# Patient Record
Sex: FEMALE | Race: WHITE | NOT HISPANIC OR LATINO | Employment: OTHER | URBAN - METROPOLITAN AREA
[De-identification: names, ages, dates, MRNs, and addresses within clinical notes are randomized per-mention and may not be internally consistent; named-entity substitution may affect disease eponyms.]

---

## 2018-08-02 ENCOUNTER — OFFICE VISIT (OUTPATIENT)
Dept: PODIATRY | Facility: CLINIC | Age: 83
End: 2018-08-02
Payer: MEDICARE

## 2018-08-02 VITALS
DIASTOLIC BLOOD PRESSURE: 59 MMHG | SYSTOLIC BLOOD PRESSURE: 114 MMHG | HEIGHT: 59 IN | BODY MASS INDEX: 33.34 KG/M2 | HEART RATE: 72 BPM | WEIGHT: 165.38 LBS

## 2018-08-02 DIAGNOSIS — M20.42 HAMMER TOES OF BOTH FEET: ICD-10-CM

## 2018-08-02 DIAGNOSIS — L84 PRE-ULCERATIVE CALLUSES: ICD-10-CM

## 2018-08-02 DIAGNOSIS — B35.1 DERMATOPHYTOSIS OF NAIL: ICD-10-CM

## 2018-08-02 DIAGNOSIS — E11.49 TYPE II DIABETES MELLITUS WITH NEUROLOGICAL MANIFESTATIONS: Primary | ICD-10-CM

## 2018-08-02 DIAGNOSIS — M20.41 HAMMER TOES OF BOTH FEET: ICD-10-CM

## 2018-08-02 DIAGNOSIS — R60.0 LOWER LEG EDEMA: ICD-10-CM

## 2018-08-02 PROCEDURE — 99204 OFFICE O/P NEW MOD 45 MIN: CPT | Mod: 25,S$PBB,, | Performed by: PODIATRIST

## 2018-08-02 PROCEDURE — 11721 DEBRIDE NAIL 6 OR MORE: CPT | Mod: Q9,S$PBB,, | Performed by: PODIATRIST

## 2018-08-02 PROCEDURE — 99203 OFFICE O/P NEW LOW 30 MIN: CPT | Mod: PBBFAC,PO,25 | Performed by: PODIATRIST

## 2018-08-02 PROCEDURE — 99999 PR PBB SHADOW E&M-NEW PATIENT-LVL III: CPT | Mod: PBBFAC,,, | Performed by: PODIATRIST

## 2018-08-02 PROCEDURE — 11721 DEBRIDE NAIL 6 OR MORE: CPT | Mod: Q9,PBBFAC,PO | Performed by: PODIATRIST

## 2018-08-02 RX ORDER — IBUPROFEN 200 MG
400 TABLET ORAL
COMMUNITY

## 2018-08-02 RX ORDER — TRIAMCINOLONE ACETONIDE 1 MG/G
CREAM TOPICAL
COMMUNITY
Start: 2018-04-16

## 2018-08-02 RX ORDER — GABAPENTIN 600 MG/1
600 TABLET ORAL
COMMUNITY
Start: 2018-03-28 | End: 2019-03-28

## 2018-08-02 RX ORDER — ZOLPIDEM TARTRATE 10 MG/1
10 TABLET ORAL
COMMUNITY
Start: 2018-07-02

## 2018-08-02 RX ORDER — LEVOTHYROXINE SODIUM 125 UG/1
125 TABLET ORAL
COMMUNITY
Start: 2018-01-30 | End: 2021-09-09 | Stop reason: DRUGHIGH

## 2018-08-02 RX ORDER — TRAVOPROST OPHTHALMIC SOLUTION 0.04 MG/ML
1 SOLUTION OPHTHALMIC
COMMUNITY
Start: 2015-11-16

## 2018-08-02 RX ORDER — NYSTATIN 100000 U/G
CREAM TOPICAL
COMMUNITY
Start: 2017-09-15 | End: 2018-09-15

## 2018-08-02 NOTE — PATIENT INSTRUCTIONS
[facility]    [address] [place] [address]  Phone: [phone]  Fax: [phone]

## 2018-08-02 NOTE — PROGRESS NOTES
Subjective:     Patient ID: Carie Rasheed is a 87 y.o. female.    Chief Complaint: toe problem (blood blister of the left great toe per Dr. Meier, patient is accompanied by her daughter )    Carie is a 87 y.o. female who presents to the clinic for evaluation and treatment of high risk feet. Carie has a past medical history of Allergic contact dermatitis, Hyperthyroidism, and Vertigo. The patient's chief complaint is toe blood blister. Patient is accompanied by her daughter today. Patient states she was sent by Dr. Meier to have her toe checked. Patient denies any injury to the toe and states the areas has healed. This patient has documented high risk feet requiring routine maintenance secondary to peripheral vascular disease.    PCP: Marcos Gonsalez MD    Date Last Seen by PCP: 6/23/18    Current shoe gear:  Affected Foot: Tennis shoes     Unaffected Foot: Tennis shoes    History of Trauma: negative  Sign of Infection: none    No results found for: HGBA1C          Patient Active Problem List   Diagnosis    Type II diabetes mellitus with neurological manifestations    Lower leg edema    Hypothyroidism       Medication List with Changes/Refills   Current Medications    GABAPENTIN (NEURONTIN) 600 MG TABLET    Take 600 mg by mouth.    IBUPROFEN (ADVIL,MOTRIN) 200 MG TABLET    Take 400 mg by mouth.    LEVOTHYROXINE (SYNTHROID) 125 MCG TABLET    Take 125 mcg by mouth.    NYSTATIN (MYCOSTATIN) CREAM    Apply topically.    TRAVOPROST (TRAVATAN Z) 0.004 % OPHTHALMIC SOLUTION    Apply 1 drop to eye.    TRIAMCINOLONE ACETONIDE 0.1% (KENALOG) 0.1 % CREAM    Apply to rash bid    ZOLPIDEM (AMBIEN) 10 MG TAB    Take 10 mg by mouth.       Review of patient's allergies indicates:   Allergen Reactions    Adhesive     Benzyl alcohol     Iron-b12-if-folic-mv-mins-dss     Mercaptobenzothiazole     Other omega-3s      [employer] [place]  Methyldibromo Gluatronitrile    Propylene glycol     Thimerosal        Past Surgical  "History:   Procedure Laterality Date    APPENDECTOMY      BLADDER SURGERY      CATARACT EXTRACTION      CHOLECYSTECTOMY      HYSTERECTOMY      INNER EAR SURGERY         History reviewed. No pertinent family history.    Social History     Socioeconomic History    Marital status:      Spouse name: Not on file    Number of children: Not on file    Years of education: Not on file    Highest education level: Not on file   Social Needs    Financial resource strain: Not on file    Food insecurity - worry: Not on file    Food insecurity - inability: Not on file    Transportation needs - medical: Not on file    Transportation needs - non-medical: Not on file   Occupational History    Not on file   Tobacco Use    Smoking status: Not on file   Substance and Sexual Activity    Alcohol use: Not on file    Drug use: Not on file    Sexual activity: Not on file   Other Topics Concern    Not on file   Social History Narrative    Not on file       Vitals:    08/02/18 1617 08/02/18 1631   BP: (!) 95/41 (!) 114/59   Pulse: 68 72   Weight: 75 kg (165 lb 5.5 oz)    Height: 4' 11" (1.499 m)    PainSc: 0-No pain        Review of Systems   Constitutional: Negative for chills and fever.   Respiratory: Negative for shortness of breath.    Cardiovascular: Positive for leg swelling. Negative for chest pain, palpitations, orthopnea and claudication.   Gastrointestinal: Negative for diarrhea, nausea and vomiting.   Musculoskeletal: Negative for joint pain.   Skin: Negative for rash.   Neurological: Positive for sensory change. Negative for dizziness, tingling, focal weakness and weakness.   Psychiatric/Behavioral: Negative.              Objective:      PHYSICAL EXAM: Apperance: Alert and orient in no distress,well developed, and with good attention to grooming and body habits  Patient presents ambulating in casual shoes.   LOWER EXTREMITY EXAM:  VASCULAR: Dorsalis pedis pulses 1/4 bilateral and Posterior Tibial pulses " 0/4 bilateral. Capillary fill time <4 seconds bilateral. Mild edema observed bilateral. Varicosities present bilateral. Skin temperature of the lower extremities is warm to cool, proximal to distal. Hair growth absent bilateral.  DERMATOLOGICAL: No skin rashes, subcutaneous nodules, lesions, or ulcers observed bilateral. Nails 2,3,4,5 bilateral elongated, thickened, and discolored with subungual debris. Bilateral hallux nails absent.  Webspaces 1,2,3,4 bilateral clean, dry and without evidence of break in skin integrity.   NEUROLOGICAL: Light touch, sharp-dull, proprioception all present and equal bilaterally.  Vibratory sensation absent at bilateral hallux. Protective sensation absent at 6/10 sites as tested with a [employer]-Laure 5.07 monofilament.   MUSCULOSKELETAL: Muscle strength is 5/5 for foot inverters, everters, plantarflexors, and dorsiflexors. Muscle tone is normal.           Assessment:       Encounter Diagnoses   Name Primary?    Type II diabetes mellitus with neurological manifestations Yes    Lower leg edema     Hammer toes of both feet     Pre-ulcerative calluses     Dermatophytosis of nail          Plan:   Type II diabetes mellitus with neurological manifestations  -     DIABETIC SHOES FOR HOME USE    Lower leg edema  -     DIABETIC SHOES FOR HOME USE    Hammer toes of both feet  -     DIABETIC SHOES FOR HOME USE    Pre-ulcerative calluses  -     DIABETIC SHOES FOR HOME USE    Dermatophytosis of nail      I counseled the patient on her conditions, regarding findings of my examination, my impressions, and usual treatment plan.   Greater than 50% of this visit spent on counseling and coordination of care.  Greater than 15 minutes of a 20 minute appointment spent on education about the diabetic foot, neuropathy, and prevention of limb loss.  Shoe inspection. Diabetic Foot Education. Patient reminded of the importance of good nutrition and blood sugar control to help prevent podiatric  complications of diabetes. Patient instructed on proper foot hygeine. We discussed wearing proper shoe gear, daily foot inspections, never walking without protective shoe gear, never putting sharp instruments to feet.    With patient's permission, nails 2-5 bilateral were debrided/trimmed in length and thickness aggressively to their soft tissue attachment mechanically and with electric , removing all offending nail and debris. Patient relates relief following the procedure.  Prescription written for Diabetic shoes and inserts.   Patient counseled on ways to improve swelling in lower extremities including decreasing/eliminating salt intake, elevating lower extremities, compression stocking, or oral medications. Patient states she understands.   Patient  will continue to monitor the areas daily, inspect feet, wear protective shoe gear when ambulatory, moisturizer to maintain skin integrity. Patient reminded of the importance of good nutrition and blood sugar control to help prevent podiatric complications of diabetes.  Patient to return 6 months or sooner if needed.                  Tessie Coates DPM  [facility]

## 2018-08-12 PROBLEM — E03.9 HYPOTHYROIDISM: Status: ACTIVE | Noted: 2018-08-12

## 2018-08-12 PROBLEM — E11.49 TYPE II DIABETES MELLITUS WITH NEUROLOGICAL MANIFESTATIONS: Status: ACTIVE | Noted: 2018-08-12

## 2018-08-12 PROBLEM — R60.0 LOWER LEG EDEMA: Status: ACTIVE | Noted: 2018-08-12

## 2018-08-27 ENCOUNTER — TELEPHONE (OUTPATIENT)
Dept: PODIATRY | Facility: CLINIC | Age: 83
End: 2018-08-27

## 2018-08-27 NOTE — TELEPHONE ENCOUNTER
----- Message from Danika Stoll sent at 8/27/2018 10:30 AM CDT -----  Contact: pt   States she's calling rg the place being referred to [employer] for her diabetic shoes and wants to know if she can go somewhere else and if not then she would need the chart notes for pt and can be reached at [phone] until noon [email or web address]

## 2018-08-27 NOTE — TELEPHONE ENCOUNTER
Patient was given a return call back at the number provided but there was no answer or an option to leave a message.

## 2018-12-05 ENCOUNTER — OFFICE VISIT (OUTPATIENT)
Dept: PODIATRY | Facility: CLINIC | Age: 83
End: 2018-12-05
Payer: MEDICARE

## 2018-12-05 VITALS
BODY MASS INDEX: 33.4 KG/M2 | HEART RATE: 70 BPM | HEIGHT: 59 IN | SYSTOLIC BLOOD PRESSURE: 116 MMHG | DIASTOLIC BLOOD PRESSURE: 59 MMHG

## 2018-12-05 DIAGNOSIS — B35.1 DERMATOPHYTOSIS OF NAIL: ICD-10-CM

## 2018-12-05 DIAGNOSIS — E11.49 TYPE II DIABETES MELLITUS WITH NEUROLOGICAL MANIFESTATIONS: Primary | ICD-10-CM

## 2018-12-05 PROCEDURE — 11721 DEBRIDE NAIL 6 OR MORE: CPT | Mod: Q9,PBBFAC,PO | Performed by: PODIATRIST

## 2018-12-05 PROCEDURE — 99499 UNLISTED E&M SERVICE: CPT | Mod: S$PBB,,, | Performed by: PODIATRIST

## 2018-12-05 PROCEDURE — 99213 OFFICE O/P EST LOW 20 MIN: CPT | Mod: PBBFAC,PO,25 | Performed by: PODIATRIST

## 2018-12-05 PROCEDURE — 11721 DEBRIDE NAIL 6 OR MORE: CPT | Mod: Q9,S$PBB,, | Performed by: PODIATRIST

## 2018-12-05 PROCEDURE — 99999 PR PBB SHADOW E&M-EST. PATIENT-LVL III: CPT | Mod: PBBFAC,,, | Performed by: PODIATRIST

## 2018-12-05 RX ORDER — TORSEMIDE 10 MG/1
10 TABLET ORAL DAILY PRN
COMMUNITY
Start: 2018-12-03 | End: 2019-12-03

## 2018-12-05 NOTE — PROGRESS NOTES
Subjective:     Patient ID: Carie Rasheed is a 87 y.o. female.    Chief Complaint: Routine Foot Care (PCP: Marcos Gonsalez 9/24/2018, diabetic nail care/feet check, patient is accompanied by her daughter )    Carie is a 87 y.o. female who presents to the clinic for evaluation and treatment of high risk feet. Carie has a past medical history of Acute allergic conjunctivitis, Allergic contact dermatitis, Hyperthyroidism, and Vertigo. The patient's chief complaint is long, thick toenails. This patient has documented high risk feet requiring routine maintenance secondary to peripheral neuropathy.    PCP: Marcos Gonsalez MD    Date Last Seen by PCP: 9/24/18    Current shoe gear:  Affected Foot: Casual shoes     Unaffected Foot: Casual shoes      Patient Active Problem List   Diagnosis    Type II diabetes mellitus with neurological manifestations    Lower leg edema    Hypothyroidism          Medication List           Accurate as of 12/5/18  1:21 PM. If you have any questions, ask your nurse or doctor.               CONTINUE taking these medications    gabapentin 600 MG tablet  Commonly known as:  NEURONTIN     ibuprofen 200 MG tablet  Commonly known as:  ADVIL,MOTRIN     levothyroxine 125 MCG tablet  Commonly known as:  SYNTHROID     nystatin cream  Commonly known as:  MYCOSTATIN     torsemide 10 MG Tab  Commonly known as:  DEMADEX     TRAVATAN Z 0.004 % ophthalmic solution  Generic drug:  travoprost     triamcinolone acetonide 0.1% 0.1 % cream  Commonly known as:  KENALOG     zolpidem 10 mg Tab  Commonly known as:  AMBIEN            Review of patient's allergies indicates:   Allergen Reactions    Adhesive     Benzyl alcohol     Iron-b12-if-folic-mv-mins-dss     Mercaptobenzothiazole     Other omega-3s      [employer] [place]  Methyldibromo Gluatronitrile    Propylene glycol     Thimerosal        Past Surgical History:   Procedure Laterality Date    APPENDECTOMY      BLADDER SURGERY      CATARACT  "EXTRACTION      CHOLECYSTECTOMY      HYSTERECTOMY      INNER EAR SURGERY         No family history on file.    Social History     Socioeconomic History    Marital status:      Spouse name: Not on file    Number of children: Not on file    Years of education: Not on file    Highest education level: Not on file   Social Needs    Financial resource strain: Not on file    Food insecurity - worry: Not on file    Food insecurity - inability: Not on file    Transportation needs - medical: Not on file    Transportation needs - non-medical: Not on file   Occupational History    Not on file   Tobacco Use    Smoking status: Never Smoker   Substance and Sexual Activity    Alcohol use: Not on file    Drug use: Not on file    Sexual activity: Not on file   Other Topics Concern    Not on file   Social History Narrative    Not on file       Vitals:    12/05/18 1304   BP: (!) 103/48   Pulse: 72   Height: 4' 11" (1.499 m)   PainSc: 0-No pain       Review of Systems   Constitutional: Negative for chills and fever.   Respiratory: Negative for shortness of breath.    Cardiovascular: Positive for leg swelling. Negative for chest pain, palpitations, orthopnea and claudication.   Gastrointestinal: Negative for diarrhea, nausea and vomiting.   Musculoskeletal: Negative for joint pain.   Skin: Negative for rash.   Neurological: Positive for sensory change. Negative for dizziness, tingling, focal weakness and weakness.   Psychiatric/Behavioral: Negative.              Objective:   PHYSICAL EXAM: Apperance: Alert and orient in no distress,well developed, and with good attention to grooming and body habits  Patient presents ambulating in casual shoes.   LOWER EXTREMITY EXAM:  VASCULAR: Dorsalis pedis pulses 1/4 bilateral and Posterior Tibial pulses 0/4 bilateral. Capillary fill time <4 seconds bilateral. Mild edema observed bilateral. Varicosities present bilateral. Skin temperature of the lower extremities is warm to " cool, proximal to distal. Hair growth absent bilateral.  DERMATOLOGICAL: No skin rashes, subcutaneous nodules, lesions, or ulcers observed bilateral. Nails 2,3,4,5 bilateral elongated, thickened, and discolored with subungual debris. Bilateral hallux nails absent.  Webspaces 1,2,3,4 bilateral clean, dry and without evidence of break in skin integrity.   NEUROLOGICAL: Light touch, sharp-dull, proprioception all present and equal bilaterally.  Vibratory sensation absent at bilateral hallux. Protective sensation absent at 6/10 sites as tested with a [employer]-Laure 5.07 monofilament.   MUSCULOSKELETAL: Muscle strength is 5/5 for foot inverters, everters, plantarflexors, and dorsiflexors. Muscle tone is normal.       Assessment:   Type II diabetes mellitus with neurological manifestations    Dermatophytosis of nail          Plan:   Type II diabetes mellitus with neurological manifestations    Dermatophytosis of nail      I counseled the patient on her conditions, regarding findings of my examination, my impressions, and usual treatment plan.   Greater than 50% of this visit spent on counseling and coordination of care.  Greater than 15 minutes of a 20 minute appointment spent on education about the diabetic foot, neuropathy, and prevention of limb loss.  Shoe inspection. Diabetic Foot Education. Patient reminded of the importance of good nutrition and blood sugar control to help prevent podiatric complications of diabetes. Patient instructed on proper foot hygeine. We discussed wearing proper shoe gear, daily foot inspections, never walking without protective shoe gear, never putting sharp instruments to feet.    With patient's permission, nails 2-5 bilateral were debrided/trimmed in length and thickness aggressively to their soft tissue attachment mechanically and with electric , removing all offending nail and debris. Patient relates relief following the procedure.  Patient  will continue to monitor the areas  daily, inspect feet, wear protective shoe gear when ambulatory, moisturizer to maintain skin integrity. Patient reminded of the importance of good nutrition and blood sugar control to help prevent podiatric complications of diabetes.  Patient to return 4 months or sooner if needed.                 Tessie Coates DPM  [facility]

## 2019-04-11 ENCOUNTER — OFFICE VISIT (OUTPATIENT)
Dept: PODIATRY | Facility: CLINIC | Age: 84
End: 2019-04-11
Payer: MEDICARE

## 2019-04-11 VITALS
SYSTOLIC BLOOD PRESSURE: 102 MMHG | HEART RATE: 52 BPM | HEIGHT: 59 IN | WEIGHT: 165 LBS | DIASTOLIC BLOOD PRESSURE: 58 MMHG | BODY MASS INDEX: 33.26 KG/M2

## 2019-04-11 DIAGNOSIS — R60.0 LOWER LEG EDEMA: ICD-10-CM

## 2019-04-11 DIAGNOSIS — B35.1 DERMATOPHYTOSIS OF NAIL: ICD-10-CM

## 2019-04-11 DIAGNOSIS — E11.49 TYPE II DIABETES MELLITUS WITH NEUROLOGICAL MANIFESTATIONS: Primary | ICD-10-CM

## 2019-04-11 PROCEDURE — 11721 PR DEBRIDEMENT OF NAILS, 6 OR MORE: ICD-10-PCS | Mod: Q9,S$PBB,, | Performed by: PODIATRIST

## 2019-04-11 PROCEDURE — 99999 PR PBB SHADOW E&M-EST. PATIENT-LVL III: ICD-10-PCS | Mod: PBBFAC,,, | Performed by: PODIATRIST

## 2019-04-11 PROCEDURE — 11721 DEBRIDE NAIL 6 OR MORE: CPT | Mod: Q9,S$PBB,, | Performed by: PODIATRIST

## 2019-04-11 PROCEDURE — 99499 NO LOS: ICD-10-PCS | Mod: S$PBB,,, | Performed by: PODIATRIST

## 2019-04-11 PROCEDURE — 99213 OFFICE O/P EST LOW 20 MIN: CPT | Mod: PBBFAC,PN,25 | Performed by: PODIATRIST

## 2019-04-11 PROCEDURE — 99499 UNLISTED E&M SERVICE: CPT | Mod: S$PBB,,, | Performed by: PODIATRIST

## 2019-04-11 PROCEDURE — 11721 DEBRIDE NAIL 6 OR MORE: CPT | Mod: Q9,PBBFAC,PN | Performed by: PODIATRIST

## 2019-04-11 PROCEDURE — 99999 PR PBB SHADOW E&M-EST. PATIENT-LVL III: CPT | Mod: PBBFAC,,, | Performed by: PODIATRIST

## 2019-04-11 RX ORDER — BUMETANIDE 1 MG/1
1 TABLET ORAL DAILY PRN
COMMUNITY
Start: 2019-02-25

## 2019-04-11 NOTE — PROGRESS NOTES
Subjective:     Patient ID: Carie Rasheed is a 87 y.o. female.    Chief Complaint: Nail Care (no c/o pain. c/o brusing of right 3rd and 4th toe. wears casual shoes with out socks. diabetic Pt. bilateral last seen on 02/21/19 with PCP Dr. Gonsalez)    Carie is a 87 y.o. female who presents to the clinic for evaluation and treatment of high risk feet. Carie has a past medical history of Acute allergic conjunctivitis, Allergic contact dermatitis, Hyperthyroidism, and Vertigo. The patient's chief complaint is long, thick toenails. This patient has documented high risk feet requiring routine maintenance secondary to peripheral neuropathy.    PCP: Marcos Gonsalez MD    Date Last Seen by PCP: 2/21/19    Current shoe gear:  Affected Foot: Casual shoes     Unaffected Foot: Casual shoes      Patient Active Problem List   Diagnosis    Type II diabetes mellitus with neurological manifestations    Lower leg edema    Hypothyroidism       Medication List with Changes/Refills   Current Medications    BUMETANIDE (BUMEX) 1 MG TABLET    Take 1 mg by mouth daily as needed.    GABAPENTIN (NEURONTIN) 600 MG TABLET    Take 600 mg by mouth.    IBUPROFEN (ADVIL,MOTRIN) 200 MG TABLET    Take 400 mg by mouth.    LEVOTHYROXINE (SYNTHROID) 125 MCG TABLET    Take 125 mcg by mouth.    MAGNESIUM OXIDE-MG AA CHELATE 300 MG CAP    Take by mouth.    NYSTATIN (MYCOSTATIN) CREAM    Apply topically.    TORSEMIDE (DEMADEX) 10 MG TAB    Take 10 mg by mouth daily as needed.    TRAVOPROST (TRAVATAN Z) 0.004 % OPHTHALMIC SOLUTION    Apply 1 drop to eye.    TRIAMCINOLONE ACETONIDE 0.1% (KENALOG) 0.1 % CREAM    Apply to rash bid    ZOLPIDEM (AMBIEN) 10 MG TAB    Take 10 mg by mouth.       Review of patient's allergies indicates:   Allergen Reactions    Adhesive     Benzyl alcohol     Iron-b12-if-folic-mv-mins-dss     Mercaptobenzothiazole     Other omega-3s      [employer] [place]  Methyldibromo Gluatronitrile    Propylene glycol     Thimerosal   "      Past Surgical History:   Procedure Laterality Date    APPENDECTOMY      BLADDER SURGERY      CATARACT EXTRACTION      CHOLECYSTECTOMY      HYSTERECTOMY      INNER EAR SURGERY         History reviewed. No pertinent family history.    Social History     Socioeconomic History    Marital status:      Spouse name: Not on file    Number of children: Not on file    Years of education: Not on file    Highest education level: Not on file   Occupational History    Not on file   Social Needs    Financial resource strain: Not on file    Food insecurity:     Worry: Not on file     Inability: Not on file    Transportation needs:     Medical: Not on file     Non-medical: Not on file   Tobacco Use    Smoking status: Never Smoker   Substance and Sexual Activity    Alcohol use: Not on file    Drug use: Not on file    Sexual activity: Not on file   Lifestyle    Physical activity:     Days per week: Not on file     Minutes per session: Not on file    Stress: Not on file   Relationships    Social connections:     Talks on phone: Not on file     Gets together: Not on file     Attends [employer] witness service: Not on file     Active member of club or organization: Not on file     Attends meetings of clubs or organizations: Not on file     Relationship status: Not on file   Other Topics Concern    Not on file   Social History Narrative    Not on file       Vitals:    04/11/19 1325   BP: (!) 102/58   Pulse: (!) 52   Weight: 74.8 kg (165 lb)   Height: 4' 11" (1.499 m)   PainSc: 0-No pain   PainLoc: Foot       Review of Systems   Constitutional: Negative for chills and fever.   Respiratory: Negative for shortness of breath.    Cardiovascular: Positive for leg swelling. Negative for chest pain, palpitations, orthopnea and claudication.   Gastrointestinal: Negative for diarrhea, nausea and vomiting.   Musculoskeletal: Negative for joint pain.   Skin: Negative for rash.   Neurological: Positive for sensory change. " Negative for dizziness, tingling, focal weakness and weakness.   Psychiatric/Behavioral: Negative.              Objective:   PHYSICAL EXAM: Apperance: Alert and orient in no distress,well developed, and with good attention to grooming and body habits  Patient presents ambulating in casual shoes.   LOWER EXTREMITY EXAM:  VASCULAR: Dorsalis pedis pulses 1/4 bilateral and Posterior Tibial pulses 0/4 bilateral. Capillary fill time <4 seconds bilateral. Mild edema observed bilateral, right>left. Varicosities present bilateral. Skin temperature of the lower extremities is warm to cool, proximal to distal. Hair growth absent bilateral.  DERMATOLOGICAL: No skin rashes, subcutaneous nodules, lesions, or ulcers observed bilateral. Nails 2,3,4,5 bilateral elongated, thickened, and discolored with subungual debris. Bilateral hallux nails absent.  Webspaces 1,2,3,4 bilateral clean, dry and without evidence of break in skin integrity.   NEUROLOGICAL: Light touch, sharp-dull, proprioception all present and equal bilaterally.  Vibratory sensation absent at bilateral hallux. Protective sensation absent at 6/10 sites as tested with a [place]-Laure 5.07 monofilament.   MUSCULOSKELETAL: Muscle strength is 5/5 for foot inverters, everters, plantarflexors, and dorsiflexors. Muscle tone is normal.       Assessment:   Type II diabetes mellitus with neurological manifestations    Dermatophytosis of nail    Lower leg edema          Plan:   Type II diabetes mellitus with neurological manifestations    Dermatophytosis of nail    Lower leg edema      I counseled the patient on her conditions, regarding findings of my examination, my impressions, and usual treatment plan.   Greater than 15 minutes of a 20 minute appointment spent on education about the diabetic foot, neuropathy, and prevention of limb loss.  Shoe inspection. Diabetic Foot Education. Patient reminded of the importance of good nutrition and blood sugar control to help prevent  podiatric complications of diabetes. Patient instructed on proper foot hygeine. We discussed wearing proper shoe gear, daily foot inspections, never walking without protective shoe gear, never putting sharp instruments to feet.    With patient's permission, nails 2-5 bilateral were debrided/trimmed in length and thickness aggressively to their soft tissue attachment mechanically and with electric , removing all offending nail and debris. Patient relates relief following the procedure.  Patient counseled on ways to improve swelling in lower extremities including decreasing/eliminating salt intake, elevating lower extremities, compression stocking, or oral medications. Patient states she understands.   Patient  will continue to monitor the areas daily, inspect feet, wear protective shoe gear when ambulatory, moisturizer to maintain skin integrity. Patient reminded of the importance of good nutrition and blood sugar control to help prevent podiatric complications of diabetes.  Patient to return 4 months or sooner if needed.                 Tessie Coates DPM  [facility]

## 2019-11-04 ENCOUNTER — OFFICE VISIT (OUTPATIENT)
Dept: PODIATRY | Facility: CLINIC | Age: 84
End: 2019-11-04
Payer: MEDICARE

## 2019-11-04 VITALS
WEIGHT: 165 LBS | SYSTOLIC BLOOD PRESSURE: 125 MMHG | DIASTOLIC BLOOD PRESSURE: 67 MMHG | BODY MASS INDEX: 33.26 KG/M2 | HEIGHT: 59 IN

## 2019-11-04 DIAGNOSIS — E11.49 TYPE II DIABETES MELLITUS WITH NEUROLOGICAL MANIFESTATIONS: Primary | ICD-10-CM

## 2019-11-04 DIAGNOSIS — R60.0 LOWER LEG EDEMA: ICD-10-CM

## 2019-11-04 DIAGNOSIS — B35.1 DERMATOPHYTOSIS OF NAIL: ICD-10-CM

## 2019-11-04 PROCEDURE — 11721 DEBRIDE NAIL 6 OR MORE: CPT | Mod: Q9,PBBFAC | Performed by: PODIATRIST

## 2019-11-04 PROCEDURE — 99999 PR PBB SHADOW E&M-EST. PATIENT-LVL III: ICD-10-PCS | Mod: PBBFAC,,, | Performed by: PODIATRIST

## 2019-11-04 PROCEDURE — 11721 DEBRIDE NAIL 6 OR MORE: CPT | Mod: Q9,S$PBB,, | Performed by: PODIATRIST

## 2019-11-04 PROCEDURE — 99499 UNLISTED E&M SERVICE: CPT | Mod: S$PBB,,, | Performed by: PODIATRIST

## 2019-11-04 PROCEDURE — 11721 PR DEBRIDEMENT OF NAILS, 6 OR MORE: ICD-10-PCS | Mod: Q9,S$PBB,, | Performed by: PODIATRIST

## 2019-11-04 PROCEDURE — 99999 PR PBB SHADOW E&M-EST. PATIENT-LVL III: CPT | Mod: PBBFAC,,, | Performed by: PODIATRIST

## 2019-11-04 PROCEDURE — 99499 NO LOS: ICD-10-PCS | Mod: S$PBB,,, | Performed by: PODIATRIST

## 2019-11-04 PROCEDURE — 99213 OFFICE O/P EST LOW 20 MIN: CPT | Mod: PBBFAC,25 | Performed by: PODIATRIST

## 2019-11-05 NOTE — PROGRESS NOTES
Subjective:     Patient ID: Carie Rasheed is a 88 y.o. female.    Chief Complaint: Nail Problem (bilateral swelling. wears slippers. diabetic Pt. last seen on 10/17/19 with PCP Dr. Gonsalez)    Carie is a 88 y.o. female who presents to the clinic for evaluation and treatment of high risk feet. Carie has a past medical history of Acute allergic conjunctivitis, Allergic contact dermatitis, Hyperthyroidism, and Vertigo. The patient's chief complaint is long, thick toenails. This patient has documented high risk feet requiring routine maintenance secondary to peripheral neuropathy. Patients daughter states patient had a partial blood clot and was on Eliquis for some months but is now on baby aspirin.     PCP: Marcos Gonsalez MD    Date Last Seen by PCP: 10/17/19    Current shoe gear:  Affected Foot: Casual shoes     Unaffected Foot: Casual shoes      Patient Active Problem List   Diagnosis    Type II diabetes mellitus with neurological manifestations    Lower leg edema    Hypothyroidism       Medication List with Changes/Refills   Current Medications    BUMETANIDE (BUMEX) 1 MG TABLET    Take 1 mg by mouth daily as needed.    GABAPENTIN (NEURONTIN) 600 MG TABLET    Take 600 mg by mouth.    IBUPROFEN (ADVIL,MOTRIN) 200 MG TABLET    Take 400 mg by mouth.    LEVOTHYROXINE (SYNTHROID) 125 MCG TABLET    Take 125 mcg by mouth.    MAGNESIUM OXIDE-MG AA CHELATE 300 MG CAP    Take by mouth.    NYSTATIN (MYCOSTATIN) CREAM    Apply topically.    TORSEMIDE (DEMADEX) 10 MG TAB    Take 10 mg by mouth daily as needed.    TRAVOPROST (TRAVATAN Z) 0.004 % OPHTHALMIC SOLUTION    Apply 1 drop to eye.    TRIAMCINOLONE ACETONIDE 0.1% (KENALOG) 0.1 % CREAM    Apply to rash bid    ZOLPIDEM (AMBIEN) 10 MG TAB    Take 10 mg by mouth.       Review of patient's allergies indicates:   Allergen Reactions    Adhesive     Benzyl alcohol     Iron-b12-if-folic-mv-mins-dss     Mercaptobenzothiazole     Other omega-3s      Balsam of  "[place]  Methyldibromo Gluatronitrile    Propylene glycol     Thimerosal        Past Surgical History:   Procedure Laterality Date    APPENDECTOMY      BLADDER SURGERY      CATARACT EXTRACTION      CHOLECYSTECTOMY      HYSTERECTOMY      INNER EAR SURGERY         History reviewed. No pertinent family history.    Social History     Socioeconomic History    Marital status:      Spouse name: Not on file    Number of children: Not on file    Years of education: Not on file    Highest education level: Not on file   Occupational History    Not on file   Social Needs    Financial resource strain: Not on file    Food insecurity:     Worry: Not on file     Inability: Not on file    Transportation needs:     Medical: Not on file     Non-medical: Not on file   Tobacco Use    Smoking status: Never Smoker   Substance and Sexual Activity    Alcohol use: Not on file    Drug use: Not on file    Sexual activity: Not on file   Lifestyle    Physical activity:     Days per week: Not on file     Minutes per session: Not on file    Stress: Not on file   Relationships    Social connections:     Talks on phone: Not on file     Gets together: Not on file     Attends Anabaptist service: Not on file     Active member of club or organization: Not on file     Attends meetings of clubs or organizations: Not on file     Relationship status: Not on file   Other Topics Concern    Not on file   Social History Narrative    Not on file       Vitals:    11/04/19 1520   BP: 125/67   Weight: 74.8 kg (165 lb)   Height: 4' 11" (1.499 m)   PainSc: 0-No pain   PainLoc: Foot       Review of Systems   Constitutional: Negative for chills and fever.   Respiratory: Negative for shortness of breath.    Cardiovascular: Positive for leg swelling. Negative for chest pain, palpitations, orthopnea and claudication.   Gastrointestinal: Negative for diarrhea, nausea and vomiting.   Musculoskeletal: Negative for joint pain.   Skin: Negative for " rash.   Neurological: Positive for sensory change. Negative for dizziness, tingling, focal weakness and weakness.   Psychiatric/Behavioral: Negative.              Objective:   PHYSICAL EXAM: Apperance: Alert and orient in no distress,well developed, and with good attention to grooming and body habits  Patient presents ambulating in casual shoes.   LOWER EXTREMITY EXAM:  VASCULAR: Dorsalis pedis pulses 1/4 bilateral and Posterior Tibial pulses 0/4 bilateral. Capillary fill time <4 seconds bilateral. Decreased edema observed bilateral, right>left. Varicosities present bilateral. Skin temperature of the lower extremities is warm to cool, proximal to distal. Hair growth absent bilateral.  DERMATOLOGICAL: No skin rashes, subcutaneous nodules, lesions, or ulcers observed bilateral. Nails 2,3,4,5 bilateral elongated, thickened, and discolored with subungual debris. Bilateral hallux nails absent.  Webspaces 1,2,3,4 bilateral clean, dry and without evidence of break in skin integrity.   NEUROLOGICAL: Light touch, sharp-dull, proprioception all present and equal bilaterally.  Vibratory sensation absent at bilateral hallux. Protective sensation absent at 6/10 sites as tested with a [place]ure 5.07 monofilament.   MUSCULOSKELETAL: Muscle strength is 5/5 for foot inverters, everters, plantarflexors, and dorsiflexors. Muscle tone is normal.       Assessment:   Type II diabetes mellitus with neurological manifestations    Dermatophytosis of nail    Lower leg edema          Plan:   Type II diabetes mellitus with neurological manifestations    Dermatophytosis of nail    Lower leg edema      I counseled the patient on her conditions, regarding findings of my examination, my impressions, and usual treatment plan.   Greater than 15 minutes of a 20 minute appointment spent on education about the diabetic foot, neuropathy, and prevention of limb loss.  Shoe inspection. Diabetic Foot Education. Patient reminded of the importance of  good nutrition and blood sugar control to help prevent podiatric complications of diabetes. Patient instructed on proper foot hygeine. We discussed wearing proper shoe gear, daily foot inspections, never walking without protective shoe gear, never putting sharp instruments to feet.    With patient's permission, nails 2-5 bilateral were debrided/trimmed in length and thickness aggressively to their soft tissue attachment mechanically and with electric , removing all offending nail and debris. Patient relates relief following the procedure.  Patient counseled on ways to improve swelling in lower extremities including decreasing/eliminating salt intake, elevating lower extremities, compression stocking, or oral medications. Patient states she understands.   Patient  will continue to monitor the areas daily, inspect feet, wear protective shoe gear when ambulatory, moisturizer to maintain skin integrity. Patient reminded of the importance of good nutrition and blood sugar control to help prevent podiatric complications of diabetes.  Patient to return 4 months or sooner if needed.                 Tessie Coates DPM  [facility]

## 2020-03-02 ENCOUNTER — TELEPHONE (OUTPATIENT)
Dept: PODIATRY | Facility: CLINIC | Age: 85
End: 2020-03-02

## 2020-03-02 NOTE — TELEPHONE ENCOUNTER
Attempted to contact pt in regards to 3/9/2020 appt. Pt unable to contact, will try again at a later time.

## 2020-03-04 ENCOUNTER — TELEPHONE (OUTPATIENT)
Dept: PODIATRY | Facility: CLINIC | Age: 85
End: 2020-03-04

## 2020-03-06 ENCOUNTER — TELEPHONE (OUTPATIENT)
Dept: PODIATRY | Facility: CLINIC | Age: 85
End: 2020-03-06

## 2020-03-06 NOTE — TELEPHONE ENCOUNTER
Called patient to reschedule podiatry appointment on Monday, March 9, 2020. There was no answer. I left a voice message requesting a call back to get rescheduled.

## 2020-03-18 ENCOUNTER — TELEPHONE (OUTPATIENT)
Dept: PODIATRY | Facility: CLINIC | Age: 85
End: 2020-03-18

## 2020-03-18 NOTE — TELEPHONE ENCOUNTER
Left detailed message for  Pt in regards to appt on March 24,2020. Explained to pt in regards to the quality and safety of Pt's during the coronavirus that we are trying to decrease the spread of infection and would be rescheduling appt. Will try again at a later date.

## 2020-06-01 ENCOUNTER — TELEPHONE (OUTPATIENT)
Dept: PODIATRY | Facility: CLINIC | Age: 85
End: 2020-06-01

## 2020-06-01 NOTE — TELEPHONE ENCOUNTER
Returned patient phone call. No answer. I left a voice message requesting a all back.    ----- Message from Reshma Dickson sent at 6/1/2020 10:51 AM CDT -----  Type:  Sooner Apoointment Request    Caller is requesting a sooner appointment.  Caller declined first available appointment listed below.  Caller will not accept being placed on the waitlist and is requesting a message be sent to doctor.  Name of Caller: Pt daughter  (Nicole)  When is the first available appointment?  Several days  Symptoms:  White spot on toe  Would the patient rather a call back or a response via [facility]?   Call back  Best Call Back Number:  [phone]  Additional Information:  Pt would like to know if possible to be seen tomorrow//please call//darnell/timothy

## 2020-06-02 ENCOUNTER — OFFICE VISIT (OUTPATIENT)
Dept: PODIATRY | Facility: CLINIC | Age: 85
End: 2020-06-02
Payer: MEDICARE

## 2020-06-02 VITALS
BODY MASS INDEX: 33.24 KG/M2 | WEIGHT: 164.88 LBS | DIASTOLIC BLOOD PRESSURE: 48 MMHG | HEART RATE: 87 BPM | SYSTOLIC BLOOD PRESSURE: 112 MMHG | HEIGHT: 59 IN

## 2020-06-02 DIAGNOSIS — R60.0 BILATERAL LOWER EXTREMITY EDEMA: ICD-10-CM

## 2020-06-02 DIAGNOSIS — B35.1 DERMATOPHYTOSIS OF NAIL: ICD-10-CM

## 2020-06-02 DIAGNOSIS — E11.42 DM TYPE 2 WITH DIABETIC PERIPHERAL NEUROPATHY: Primary | ICD-10-CM

## 2020-06-02 DIAGNOSIS — M20.41 HAMMERTOE OF SECOND TOE OF RIGHT FOOT: ICD-10-CM

## 2020-06-02 PROCEDURE — 99999 PR PBB SHADOW E&M-EST. PATIENT-LVL III: ICD-10-PCS | Mod: PBBFAC,,, | Performed by: PODIATRIST

## 2020-06-02 PROCEDURE — 11721 DEBRIDE NAIL 6 OR MORE: CPT | Mod: Q9,S$PBB,, | Performed by: PODIATRIST

## 2020-06-02 PROCEDURE — 11721 PR DEBRIDEMENT OF NAILS, 6 OR MORE: ICD-10-PCS | Mod: Q9,S$PBB,, | Performed by: PODIATRIST

## 2020-06-02 PROCEDURE — 99999 PR PBB SHADOW E&M-EST. PATIENT-LVL III: CPT | Mod: PBBFAC,,, | Performed by: PODIATRIST

## 2020-06-02 PROCEDURE — 99214 OFFICE O/P EST MOD 30 MIN: CPT | Mod: 25,S$PBB,, | Performed by: PODIATRIST

## 2020-06-02 PROCEDURE — 99213 OFFICE O/P EST LOW 20 MIN: CPT | Mod: PBBFAC | Performed by: PODIATRIST

## 2020-06-02 PROCEDURE — 11721 DEBRIDE NAIL 6 OR MORE: CPT | Mod: Q9,PBBFAC | Performed by: PODIATRIST

## 2020-06-02 PROCEDURE — 99214 PR OFFICE/OUTPT VISIT, EST, LEVL IV, 30-39 MIN: ICD-10-PCS | Mod: 25,S$PBB,, | Performed by: PODIATRIST

## 2020-06-02 RX ORDER — POTASSIUM CHLORIDE 750 MG/1
10 TABLET, EXTENDED RELEASE ORAL
COMMUNITY
Start: 2019-02-25

## 2020-06-02 RX ORDER — DICLOFENAC SODIUM 10 MG/G
2 GEL TOPICAL 2 TIMES DAILY
Qty: 60 G | Refills: 0 | Status: SHIPPED | OUTPATIENT
Start: 2020-06-02 | End: 2020-06-02

## 2020-06-02 RX ORDER — ASPIRIN 81 MG/1
81 TABLET ORAL
COMMUNITY

## 2020-06-02 NOTE — PATIENT INSTRUCTIONS
Treating Mallet, Hammer, and Claw Toes  Definitions  A hammer toe has an abnormal bend in the middle joint of your toe (toe is bent upward at joint).  Mallet toe affects the joint nearest your toenail (toe is bent downward at joint).  Claw toe affects the joint at the ball of your foot (toe is bent upward at joint), as well as both toe joints (toe is bent downward at both joints).  Hammer toe and mallet toe are most likely to occur in the toe next to your big toe.  Causes  The most common cause for all 3 deformities is poorly fitting shoes and tight shoes, especially high heels for women. Trauma and nerve damage from various diseases like diabetes may also cause these deformities.  Treatment  Buying shoes with more room in the toes, filing down corns and calluses, and padding, taping, or strapping the toe most often relieve the pain. Toe stretching and exercises may also be helpful. If these steps dont work, you may need surgery to straighten your toes.  Shoes  Buy low-heeled shoes with plenty of room in the front. This keeps your toes from being jammed against the end of your shoe. It also keeps your shoe from rubbing the tops of your toes.  Corns and calluses    To file down a corn or callus, soak your foot in warm water. This softens the hard skin. Dry your foot. Then gently rub the corn or callus with a pumice stone or nail file.  Pads and splints  If you still have pain, you may need to put a pad or splint on your toe. This helps take pressure off the painful corn or callus.  · For a mallet toe, you can put a gel pad on the toe. This keeps the tip of the toe from rubbing against the bottom of the shoe.  · For a hammer or claw toe, you can put a felt or foam pad over the bent joint. This keeps the toe from rubbing on the top of the shoe.  · For a hammer or claw toe that is still flexible, you can put a splint on the toe. This keeps it straight so it doesn't rub on the top of the shoe.    Date Last Reviewed:  9/29/2015  © 6052-5100 [employer]. [address] [place] [address], [place] [address]. All rights reserved. This information is not intended as a substitute for professional medical care. Always follow your healthcare professional's instructions.

## 2020-06-02 NOTE — PROGRESS NOTES
PODIATRIC MEDICINE AND SURGERY     CHIEF COMPLAINT  Chief Complaint   Patient presents with    Hammer Toe     Pt complains of hammertoes b/l on the 2nd toes. Pt states no current pain    PCP: Dr. Gonsalez, last visit: 4/2020    HPI  SUBJECTIVE: Carie Rasheed is a 88 y.o. female who  has a past medical history of Acute allergic conjunctivitis, Allergic contact dermatitis, Hyperthyroidism, and Vertigo. Cariepresents to clinic for high risk diabetic foot exam and care.     Patient is routinely seen by Dr. Coates for 6 month high risk foot exam.  Carie admits numbness, burning, and/or tingling sensations in their feet. Patient admits to painful toenails aggravated by increased weight bearing, shoe gear, and pressure. States pain is relieved with routine debridements. Patient has no other pedal complaints at this time.    I did contact daughter Jessica via telephone who provided further history. Daughter states mother has hx of shingles and is currently on gabapentin for diabetic neuropathy. She has been on gabapentin several years. They did try to switch to Lyrica due to side effects from medication, but states her side effects were worse with lyrica. Daughter states mother did have severe pain to LEFT toe but denies any trauma. Daughter states mother did have fracture on RIGHT second toe several years ago. Mother is asymptomatic at site. They do request DM shoes and inserts as patient has received in the past.     This patient has documented high risk feet requiring routine maintenance secondary to diabetes mellitis and those secondary complications of diabetes, as mentioned.    HgA1c: No results found for: HGBA1C      PMH  Past Medical History:   Diagnosis Date    Acute allergic conjunctivitis     Allergic contact dermatitis     Hyperthyroidism     Vertigo      Patient Active Problem List   Diagnosis    Type II diabetes mellitus with neurological manifestations    Lower leg edema    Hypothyroidism        MEDS  Current Outpatient Medications on File Prior to Visit   Medication Sig Dispense Refill    aspirin (ECOTRIN) 81 MG EC tablet Take 81 mg by mouth.      bumetanide (BUMEX) 1 MG tablet Take 1 mg by mouth daily as needed.      ibuprofen (ADVIL,MOTRIN) 200 MG tablet Take 400 mg by mouth.      levothyroxine (SYNTHROID) 125 MCG tablet Take 125 mcg by mouth.      magnesium oxide-Mg AA chelate 300 mg Cap Take by mouth.      potassium chloride (KLOR-CON) 10 MEQ TbSR Take 10 mEq by mouth.      travoprost (TRAVATAN Z) 0.004 % ophthalmic solution Apply 1 drop to eye.      triamcinolone acetonide 0.1% (KENALOG) 0.1 % cream Apply to rash bid      zolpidem (AMBIEN) 10 mg Tab Take 10 mg by mouth.      gabapentin (NEURONTIN) 600 MG tablet Take 600 mg by mouth.      nystatin (MYCOSTATIN) cream Apply topically.      torsemide (DEMADEX) 10 MG Tab Take 10 mg by mouth daily as needed.       No current facility-administered medications on file prior to visit.        PSH     Past Surgical History:   Procedure Laterality Date    APPENDECTOMY      BLADDER SURGERY      CATARACT EXTRACTION      CHOLECYSTECTOMY      HYSTERECTOMY      INNER EAR SURGERY          ALL  Review of patient's allergies indicates:   Allergen Reactions    Adhesive     Benzyl alcohol     Iron-b12-if-folic-mv-mins-dss     Mercaptobenzothiazole     Other omega-3s      Balsam of [place]  Methyldibromo Gluatronitrile    Propylene glycol     Thimerosal        SOC     Social History     Tobacco Use    Smoking status: Never Smoker   Substance Use Topics    Alcohol use: Not on file    Drug use: Not on file         Family HX  History reviewed. No pertinent family history.         REVIEW OF SYSTEMS  General: Denies any fever or chills  Chest: Denies shortness of breath, wheezing, coughing, or sputum production  Heart: Denies chest pain.  As noted above and per history of current illness above, otherwise negative in the remainder of the 14 systems.  "    PHYSICAL EXAM  Vitals:    06/02/20 1158   BP: (!) 112/48   Pulse: 87   Weight: 74.8 kg (164 lb 14.5 oz)   Height: 4' 11" (1.499 m)       GEN:  This patient is well-developed, well-nourished and appears stated age, well-oriented to person, place and time, and cooperative and pleasant on today's visit.      LOWER EXTREMITY    VASCULAR  DP pedal pulse 2/4 RIGHT, LEFT2/4   PT pedal pulse 1/4 RIGHT, LEFT1/4  Capillary refill time immediate to the toes.   Feet are warm to the touch. Skin temperature warm to warm from proximally to distally   There are varicosities, telangiectasias noted to bilateral foot and ankle regions.   There are no ecchymoses noted to bilateral foot and ankle regions.   There is moderate gross lower extremity edema b/l with increased edema noted on RIGHT LE    DERMATOLOGIC  Skin moist with healthy texture and turgor.  Thickened, dystrophic, elongated, discolored toenails with subungal debris 1-5 b/l  There are no open ulcerations, lacerations, or fissures to bilateral foot and ankle regions. There are no signs of infection as there is no erythema, no proximal-extending lymphangiitis, no fluctuance, or crepitus noted on palpation to bilateral foot and ankle regions.   There is no interdigital maceration.   There are hyperkeratotic lesions noted to feet.    NEUROLOGIC  Protective sensation intact at 10/10 sites upon examination with [place] Weinsten 5.07 g monofilament.  Babinski reflex absent    ORTHOPEDIC/BIOMECHANICAL  No symptomatic structural abnormalities noted.   Rigid hammertoe contracture noted on right second digit.  Left second digit no acute SOI, negative pain with diffuse palpation   Muscle strength is 5/5 for foot inverters, everters, plantarflexors, and dorsiflexors. Muscle tone is normal.  Inspection/palpation of bone, joints and muscles unremarkable.    ASSESSMENT  DM type 2 with diabetic peripheral neuropathy  -     DIABETIC SHOES FOR HOME USE    Hammertoe of second toe of right " foot  -     DIABETIC SHOES FOR HOME USE    Dermatophytosis of nail  -     DIABETIC SHOES FOR HOME USE    Bilateral lower extremity edema  -     DIABETIC SHOES FOR HOME USE    Other orders  -     Discontinue: diclofenac sodium (VOLTAREN) 1 % Gel; Apply 2 g topically 2 (two) times daily. As needed to pain on foot  Dispense: 60 g; Refill: 0        PLAN  -The patient was examined and evaulated  -Discuss presenting problems, etiology, pathologic processes and management options with patient today.   -I counseled the patient on their conditions, their implications and medical management. An in depth discussion on diabetic management, risk prevention, amputation prevention verbally and provided educational literature in written format.    -With patient's permission, the elongated onychomycotic toenails, as outlined in the physical examination, were sharply debrided/trimmed with a double action nail nipper to their soft tissue attachment. If indicated, the nails were then smoothed down in thickness with an patito board to facilitate in further debridement removing all offending nail and subungual debris. Patient relates relief following the procedure.     - Shoe inspection. Diabetic Foot Education. Patient reminded of the importance of good nutrition and blood sugar control to help prevent podiatric complications of diabetes. Patient instructed on proper foot hygeine. We discussed wearing proper shoe gear, daily foot inspections, never walking without protective shoe gear, never putting sharp instruments to feet, routine podiatric nail visits every 3-4 months.      -Rx DM shoes and inserts. Extra depth and custom orthotics to accommodate foot edema and hammertoe contracture    The total visit time face to face with the patient was over 30 minutes. Time spent in counseling, discussion and coordination of care with the patient was 25 minutes with daughter Jessica via telephone.  Topics discussed as noted above. Discussed topical  pain cream. Daughter states allergy to propylene glycol. Will send topical compound with Gabapentin and NSAID.    RTC every 6 months for routine foot exam with Dr. Coates      Disclaimer: This note was partially prepared using a voice recognition system and is likely to have sound alike errors within the text.         Report Electronically Signed By:     Rachael Mcgee DPM   Podiatry  [facility]  6/2/2020

## 2021-01-13 ENCOUNTER — IMMUNIZATION (OUTPATIENT)
Dept: INTERNAL MEDICINE | Facility: CLINIC | Age: 86
End: 2021-01-13
Payer: MEDICARE

## 2021-01-13 ENCOUNTER — OFFICE VISIT (OUTPATIENT)
Dept: PODIATRY | Facility: CLINIC | Age: 86
End: 2021-01-13
Payer: COMMERCIAL

## 2021-01-13 VITALS
DIASTOLIC BLOOD PRESSURE: 60 MMHG | HEIGHT: 59 IN | BODY MASS INDEX: 33.06 KG/M2 | SYSTOLIC BLOOD PRESSURE: 133 MMHG | HEART RATE: 77 BPM | WEIGHT: 164 LBS

## 2021-01-13 DIAGNOSIS — R60.0 BILATERAL LOWER EXTREMITY EDEMA: ICD-10-CM

## 2021-01-13 DIAGNOSIS — B35.1 DERMATOPHYTOSIS OF NAIL: ICD-10-CM

## 2021-01-13 DIAGNOSIS — Z23 NEED FOR VACCINATION: ICD-10-CM

## 2021-01-13 DIAGNOSIS — E11.42 DM TYPE 2 WITH DIABETIC PERIPHERAL NEUROPATHY: Primary | ICD-10-CM

## 2021-01-13 PROCEDURE — 11721 PR DEBRIDEMENT OF NAILS, 6 OR MORE: ICD-10-PCS | Mod: S$GLB,,, | Performed by: PODIATRIST

## 2021-01-13 PROCEDURE — 99999 PR PBB SHADOW E&M-EST. PATIENT-LVL III: CPT | Mod: PBBFAC,,, | Performed by: PODIATRIST

## 2021-01-13 PROCEDURE — 99999 PR PBB SHADOW E&M-EST. PATIENT-LVL III: ICD-10-PCS | Mod: PBBFAC,,, | Performed by: PODIATRIST

## 2021-01-13 PROCEDURE — 99213 PR OFFICE/OUTPT VISIT, EST, LEVL III, 20-29 MIN: ICD-10-PCS | Mod: 25,S$GLB,, | Performed by: PODIATRIST

## 2021-01-13 PROCEDURE — 91300 COVID-19, MRNA, LNP-S, PF, 30 MCG/0.3 ML DOSE VACCINE: CPT | Mod: PBBFAC | Performed by: FAMILY MEDICINE

## 2021-01-13 PROCEDURE — 11721 DEBRIDE NAIL 6 OR MORE: CPT | Mod: Q9,PBBFAC | Performed by: PODIATRIST

## 2021-01-13 PROCEDURE — 99213 OFFICE O/P EST LOW 20 MIN: CPT | Mod: PBBFAC,25 | Performed by: PODIATRIST

## 2021-01-13 PROCEDURE — 11721 DEBRIDE NAIL 6 OR MORE: CPT | Mod: S$GLB,,, | Performed by: PODIATRIST

## 2021-01-13 PROCEDURE — 99213 OFFICE O/P EST LOW 20 MIN: CPT | Mod: 25,S$GLB,, | Performed by: PODIATRIST

## 2021-01-13 RX ORDER — AMPICILLIN 500 MG/1
500 CAPSULE ORAL
COMMUNITY
Start: 2021-01-08 | End: 2021-01-15

## 2021-02-03 ENCOUNTER — IMMUNIZATION (OUTPATIENT)
Dept: INTERNAL MEDICINE | Facility: CLINIC | Age: 86
End: 2021-02-03
Payer: MEDICARE

## 2021-02-03 DIAGNOSIS — Z23 NEED FOR VACCINATION: Primary | ICD-10-CM

## 2021-02-03 PROCEDURE — 91300 COVID-19, MRNA, LNP-S, PF, 30 MCG/0.3 ML DOSE VACCINE: CPT | Mod: PBBFAC | Performed by: FAMILY MEDICINE

## 2021-02-03 PROCEDURE — 0002A COVID-19, MRNA, LNP-S, PF, 30 MCG/0.3 ML DOSE VACCINE: CPT | Mod: PBBFAC | Performed by: FAMILY MEDICINE

## 2021-09-09 ENCOUNTER — OFFICE VISIT (OUTPATIENT)
Dept: PODIATRY | Facility: CLINIC | Age: 86
End: 2021-09-09
Payer: COMMERCIAL

## 2021-09-09 VITALS
HEIGHT: 59 IN | DIASTOLIC BLOOD PRESSURE: 57 MMHG | BODY MASS INDEX: 33.34 KG/M2 | SYSTOLIC BLOOD PRESSURE: 119 MMHG | WEIGHT: 165.38 LBS | HEART RATE: 87 BPM

## 2021-09-09 DIAGNOSIS — B35.1 DERMATOPHYTOSIS OF NAIL: ICD-10-CM

## 2021-09-09 DIAGNOSIS — E11.42 DM TYPE 2 WITH DIABETIC PERIPHERAL NEUROPATHY: Primary | ICD-10-CM

## 2021-09-09 DIAGNOSIS — R60.0 BILATERAL LOWER EXTREMITY EDEMA: ICD-10-CM

## 2021-09-09 PROCEDURE — 11721 PR DEBRIDEMENT OF NAILS, 6 OR MORE: ICD-10-PCS | Mod: S$GLB,,, | Performed by: PODIATRIST

## 2021-09-09 PROCEDURE — 99499 NO LOS: ICD-10-PCS | Mod: S$GLB,,, | Performed by: PODIATRIST

## 2021-09-09 PROCEDURE — 99499 UNLISTED E&M SERVICE: CPT | Mod: S$GLB,,, | Performed by: PODIATRIST

## 2021-09-09 PROCEDURE — 99999 PR PBB SHADOW E&M-EST. PATIENT-LVL III: ICD-10-PCS | Mod: PBBFAC,,, | Performed by: PODIATRIST

## 2021-09-09 PROCEDURE — 99999 PR PBB SHADOW E&M-EST. PATIENT-LVL III: CPT | Mod: PBBFAC,,, | Performed by: PODIATRIST

## 2021-09-09 PROCEDURE — 11721 DEBRIDE NAIL 6 OR MORE: CPT | Mod: S$GLB,,, | Performed by: PODIATRIST

## 2021-09-09 RX ORDER — LEVOTHYROXINE SODIUM 100 UG/1
1 TABLET ORAL EVERY MORNING
COMMUNITY
Start: 2021-09-03

## 2021-10-06 ENCOUNTER — IMMUNIZATION (OUTPATIENT)
Dept: PRIMARY CARE CLINIC | Facility: CLINIC | Age: 86
End: 2021-10-06
Payer: COMMERCIAL

## 2021-10-06 DIAGNOSIS — Z23 NEED FOR VACCINATION: Primary | ICD-10-CM

## 2021-10-06 PROCEDURE — 91300 COVID-19, MRNA, LNP-S, PF, 30 MCG/0.3 ML DOSE VACCINE: CPT | Mod: PBBFAC | Performed by: FAMILY MEDICINE

## 2021-10-06 PROCEDURE — 0003A COVID-19, MRNA, LNP-S, PF, 30 MCG/0.3 ML DOSE VACCINE: CPT | Mod: CV19,PBBFAC | Performed by: FAMILY MEDICINE

## 2022-03-23 ENCOUNTER — OFFICE VISIT (OUTPATIENT)
Dept: PODIATRY | Facility: CLINIC | Age: 87
End: 2022-03-23
Payer: MEDICARE

## 2022-03-23 VITALS
HEART RATE: 77 BPM | HEIGHT: 59 IN | BODY MASS INDEX: 33.34 KG/M2 | DIASTOLIC BLOOD PRESSURE: 62 MMHG | SYSTOLIC BLOOD PRESSURE: 127 MMHG | WEIGHT: 165.38 LBS

## 2022-03-23 DIAGNOSIS — B35.1 DERMATOPHYTOSIS OF NAIL: ICD-10-CM

## 2022-03-23 DIAGNOSIS — E11.42 DM TYPE 2 WITH DIABETIC PERIPHERAL NEUROPATHY: Primary | ICD-10-CM

## 2022-03-23 PROCEDURE — 99999 PR PBB SHADOW E&M-EST. PATIENT-LVL IV: ICD-10-PCS | Mod: PBBFAC,,, | Performed by: PODIATRIST

## 2022-03-23 PROCEDURE — 11721 DEBRIDE NAIL 6 OR MORE: CPT | Mod: Q9,S$GLB,, | Performed by: PODIATRIST

## 2022-03-23 PROCEDURE — 99213 OFFICE O/P EST LOW 20 MIN: CPT | Mod: 25,S$GLB,, | Performed by: PODIATRIST

## 2022-03-23 PROCEDURE — 11721 PR DEBRIDEMENT OF NAILS, 6 OR MORE: ICD-10-PCS | Mod: Q9,S$GLB,, | Performed by: PODIATRIST

## 2022-03-23 PROCEDURE — 99999 PR PBB SHADOW E&M-EST. PATIENT-LVL IV: CPT | Mod: PBBFAC,,, | Performed by: PODIATRIST

## 2022-03-23 PROCEDURE — 11721 DEBRIDE NAIL 6 OR MORE: CPT | Mod: Q9,PBBFAC | Performed by: PODIATRIST

## 2022-03-23 PROCEDURE — 99213 PR OFFICE/OUTPT VISIT, EST, LEVL III, 20-29 MIN: ICD-10-PCS | Mod: 25,S$GLB,, | Performed by: PODIATRIST

## 2022-03-23 PROCEDURE — 99214 OFFICE O/P EST MOD 30 MIN: CPT | Mod: PBBFAC | Performed by: PODIATRIST

## 2022-04-04 NOTE — PROGRESS NOTES
Subjective:     Patient ID: Carie Rasheed is a 89 y.o. female.    Chief Complaint: Toe Pain (Pt c/o BL hallux toe bruises with pain 5/10, diabetic pt wears slippers w/ socks, PCP Dr. Gonsalez last seen 11-4-21) and Nail Care    Carie is a 89 y.o. female who presents to the clinic for evaluation and treatment of high risk feet. Carie has a past medical history of Acute allergic conjunctivitis, Allergic contact dermatitis, Cancer of skin of left lower leg (2022), Hyperthyroidism, and Vertigo. The patient's chief complaint is long, thick toenails. This patient has documented high risk feet requiring routine maintenance secondary to peripheral neuropathy.     PCP: Marcos Gonsalez MD    Date Last Seen by PCP: 11/04/2021    Current shoe gear:  Affected Foot: Casual shoes     Unaffected Foot: Casual shoes      Patient Active Problem List   Diagnosis    Type II diabetes mellitus with neurological manifestations    Lower leg edema    Hypothyroidism       Medication List with Changes/Refills   Current Medications    ASPIRIN (ECOTRIN) 81 MG EC TABLET    Take 81 mg by mouth.    BUMETANIDE (BUMEX) 1 MG TABLET    Take 1 mg by mouth daily as needed.    GABAPENTIN (NEURONTIN) 600 MG TABLET    Take 600 mg by mouth.    IBUPROFEN (ADVIL,MOTRIN) 200 MG TABLET    Take 400 mg by mouth.    LEVOTHYROXINE (SYNTHROID) 100 MCG TABLET    Take 1 tablet by mouth every morning.    MAGNESIUM OXIDE-MG AA CHELATE 300 MG CAP    Take by mouth.    NYSTATIN (MYCOSTATIN) CREAM    Apply topically.    POTASSIUM CHLORIDE (KLOR-CON) 10 MEQ TBSR    Take 10 mEq by mouth.    TORSEMIDE (DEMADEX) 10 MG TAB    Take 10 mg by mouth daily as needed.    TRAVOPROST (TRAVATAN Z) 0.004 % OPHTHALMIC SOLUTION    Apply 1 drop to eye.    TRIAMCINOLONE ACETONIDE 0.1% (KENALOG) 0.1 % CREAM    Apply to rash bid    ZOLPIDEM (AMBIEN) 10 MG TAB    Take 10 mg by mouth.       Review of patient's allergies indicates:   Allergen Reactions    Adhesive     Benzyl alcohol   "   Iron-b12-if-folic-mv-mins-dss     Mercaptobenzothiazole     Other omega-3s      [employer] [place]  Methyldibromo Gluatronitrile    Propylene glycol     Thimerosal        Past Surgical History:   Procedure Laterality Date    APPENDECTOMY      BLADDER SURGERY      CATARACT EXTRACTION      CHOLECYSTECTOMY      HYSTERECTOMY      INNER EAR SURGERY         History reviewed. No pertinent family history.    Social History     Socioeconomic History    Marital status:    Tobacco Use    Smoking status: Never Smoker    Smokeless tobacco: Never Used       Vitals:    03/23/22 1525   BP: 127/62   Pulse: 77   Weight: 75 kg (165 lb 5.5 oz)   Height: 4' 11" (1.499 m)   PainSc:   5       Review of Systems   Constitutional: Negative for chills and fever.   Respiratory: Negative for shortness of breath.    Cardiovascular: Positive for leg swelling. Negative for chest pain, palpitations, orthopnea and claudication.   Gastrointestinal: Negative for diarrhea, nausea and vomiting.   Musculoskeletal: Negative for joint pain.   Skin: Negative for rash.   Neurological: Positive for sensory change. Negative for dizziness, tingling, focal weakness and weakness.   Psychiatric/Behavioral: Negative.              Objective:   PHYSICAL EXAM: Apperance: Alert and orient in no distress,well developed, and with good attention to grooming and body habits  Patient presents ambulating in casual shoes.   LOWER EXTREMITY EXAM:  VASCULAR: Dorsalis pedis pulses 1/4 bilateral and Posterior Tibial pulses 0/4 bilateral. Capillary fill time <4 seconds bilateral. Decreased edema observed bilateral, right>left. Varicosities present bilateral. Skin temperature of the lower extremities is warm to cool, proximal to distal. Hair growth absent bilateral.  DERMATOLOGICAL: No skin rashes, subcutaneous nodules, lesions, or ulcers observed bilateral. Nails 2,3,4,5 bilateral elongated, thickened, and discolored with subungual debris. Bilateral hallux " nails absent.  Webspaces 1,2,3,4 bilateral clean, dry and without evidence of break in skin integrity.   NEUROLOGICAL: Light touch, sharp-dull, proprioception all present and equal bilaterally.  Vibratory sensation absent at bilateral hallux. Protective sensation absent at 6/10 sites as tested with a [place]-Laure 5.07 monofilament.   MUSCULOSKELETAL: Muscle strength is 5/5 for foot inverters, everters, plantarflexors, and dorsiflexors. Muscle tone is normal.       Assessment:   DM type 2 with diabetic peripheral neuropathy    Dermatophytosis of nail          Plan:   DM type 2 with diabetic peripheral neuropathy    Dermatophytosis of nail      I counseled the patient on her conditions, regarding findings of my examination, my impressions, and usual treatment plan.   Greater than 50% of this visit spent on counseling and coordination of care.  Greater than 15 minutes of a 20 minute appointment spent on education about the diabetic foot, neuropathy, and prevention of limb loss.  Shoe inspection. Diabetic Foot Education. Patient reminded of the importance of good nutrition and blood sugar control to help prevent podiatric complications of diabetes. Patient instructed on proper foot hygeine. We discussed wearing proper shoe gear, daily foot inspections, never walking without protective shoe gear, never putting sharp instruments to feet.    With patient's permission, nails 2-5 bilateral were debrided/trimmed in length and thickness aggressively to their soft tissue attachment mechanically and with electric , removing all offending nail and debris. Patient relates relief following the procedure.  Patient counseled on ways to improve swelling in lower extremities including decreasing/eliminating salt intake, elevating lower extremities, compression stocking, or oral medications. Patient states she understands.   Patient  will continue to monitor the areas daily, inspect feet, wear protective shoe gear when  ambulatory, moisturizer to maintain skin integrity. Patient reminded of the importance of good nutrition and blood sugar control to help prevent podiatric complications of diabetes.  Patient to return 4 months or sooner if needed.                 Tessie Coates DPM  [facility]

## 2022-07-27 ENCOUNTER — OFFICE VISIT (OUTPATIENT)
Dept: PODIATRY | Facility: CLINIC | Age: 87
End: 2022-07-27
Payer: MEDICARE

## 2022-07-27 VITALS — WEIGHT: 165 LBS | HEIGHT: 59 IN | BODY MASS INDEX: 33.26 KG/M2

## 2022-07-27 DIAGNOSIS — B35.1 DERMATOPHYTOSIS OF NAIL: ICD-10-CM

## 2022-07-27 DIAGNOSIS — R60.0 BILATERAL LOWER EXTREMITY EDEMA: ICD-10-CM

## 2022-07-27 DIAGNOSIS — E11.42 DM TYPE 2 WITH DIABETIC PERIPHERAL NEUROPATHY: Primary | ICD-10-CM

## 2022-07-27 PROCEDURE — 11721 PR DEBRIDEMENT OF NAILS, 6 OR MORE: ICD-10-PCS | Mod: Q9,S$GLB,, | Performed by: PODIATRIST

## 2022-07-27 PROCEDURE — 1160F RVW MEDS BY RX/DR IN RCRD: CPT | Mod: CPTII,S$GLB,, | Performed by: PODIATRIST

## 2022-07-27 PROCEDURE — 3288F FALL RISK ASSESSMENT DOCD: CPT | Mod: CPTII,S$GLB,, | Performed by: PODIATRIST

## 2022-07-27 PROCEDURE — 1159F PR MEDICATION LIST DOCUMENTED IN MEDICAL RECORD: ICD-10-PCS | Mod: CPTII,S$GLB,, | Performed by: PODIATRIST

## 2022-07-27 PROCEDURE — 1101F PR PT FALLS ASSESS DOC 0-1 FALLS W/OUT INJ PAST YR: ICD-10-PCS | Mod: CPTII,S$GLB,, | Performed by: PODIATRIST

## 2022-07-27 PROCEDURE — 1159F MED LIST DOCD IN RCRD: CPT | Mod: CPTII,S$GLB,, | Performed by: PODIATRIST

## 2022-07-27 PROCEDURE — 3288F PR FALLS RISK ASSESSMENT DOCUMENTED: ICD-10-PCS | Mod: CPTII,S$GLB,, | Performed by: PODIATRIST

## 2022-07-27 PROCEDURE — 99999 PR PBB SHADOW E&M-EST. PATIENT-LVL III: CPT | Mod: PBBFAC,,, | Performed by: PODIATRIST

## 2022-07-27 PROCEDURE — 1101F PT FALLS ASSESS-DOCD LE1/YR: CPT | Mod: CPTII,S$GLB,, | Performed by: PODIATRIST

## 2022-07-27 PROCEDURE — 99499 UNLISTED E&M SERVICE: CPT | Mod: S$GLB,,, | Performed by: PODIATRIST

## 2022-07-27 PROCEDURE — 11721 DEBRIDE NAIL 6 OR MORE: CPT | Mod: Q9,S$GLB,, | Performed by: PODIATRIST

## 2022-07-27 PROCEDURE — 99499 NO LOS: ICD-10-PCS | Mod: S$GLB,,, | Performed by: PODIATRIST

## 2022-07-27 PROCEDURE — 99999 PR PBB SHADOW E&M-EST. PATIENT-LVL III: ICD-10-PCS | Mod: PBBFAC,,, | Performed by: PODIATRIST

## 2022-07-27 PROCEDURE — 1126F PR PAIN SEVERITY QUANTIFIED, NO PAIN PRESENT: ICD-10-PCS | Mod: CPTII,S$GLB,, | Performed by: PODIATRIST

## 2022-07-27 PROCEDURE — 1160F PR REVIEW ALL MEDS BY PRESCRIBER/CLIN PHARMACIST DOCUMENTED: ICD-10-PCS | Mod: CPTII,S$GLB,, | Performed by: PODIATRIST

## 2022-07-27 PROCEDURE — 1126F AMNT PAIN NOTED NONE PRSNT: CPT | Mod: CPTII,S$GLB,, | Performed by: PODIATRIST

## 2022-08-04 NOTE — PROGRESS NOTES
Subjective:     Patient ID: Carie Rasheed is a 89 y.o. female.    Chief Complaint: Nail Care (No c/o pain, c/p soreness to left hallux, Wears slippers, diabetic Pt, PCP Dr. Gonsalez, last seen on 05/17/22 per patient.)    Carie is a 89 y.o. female who presents to the clinic for evaluation and treatment of high risk feet. Carie has a past medical history of Acute allergic conjunctivitis, Allergic contact dermatitis, Cancer of skin of left lower leg (2022), Hyperthyroidism, and Vertigo. The patient's chief complaint is long, thick toenails. This patient has documented high risk feet requiring routine maintenance secondary to diabetes mellitis and those secondary complications of diabetes, as mentioned..    PCP: Marcos Gonsalez MD    Date Last Seen by PCP: 05/17/2022    Current shoe gear:  Affected Foot: Slip-on shoes     Unaffected Foot: Slip-on shoes    No results found for: HGBA1C    Patient Active Problem List   Diagnosis    Type II diabetes mellitus with neurological manifestations    Lower leg edema    Hypothyroidism       Medication List with Changes/Refills   Current Medications    ASPIRIN (ECOTRIN) 81 MG EC TABLET    Take 81 mg by mouth.    BUMETANIDE (BUMEX) 1 MG TABLET    Take 1 mg by mouth daily as needed.    GABAPENTIN (NEURONTIN) 600 MG TABLET    Take 600 mg by mouth.    IBUPROFEN (ADVIL,MOTRIN) 200 MG TABLET    Take 400 mg by mouth.    LEVOTHYROXINE (SYNTHROID) 100 MCG TABLET    Take 1 tablet by mouth every morning.    MAGNESIUM OXIDE-MG AA CHELATE 300 MG CAP    Take by mouth.    NYSTATIN (MYCOSTATIN) CREAM    Apply topically.    POTASSIUM CHLORIDE (KLOR-CON) 10 MEQ TBSR    Take 10 mEq by mouth.    TORSEMIDE (DEMADEX) 10 MG TAB    Take 10 mg by mouth daily as needed.    TRAVOPROST (TRAVATAN Z) 0.004 % OPHTHALMIC SOLUTION    Apply 1 drop to eye.    TRIAMCINOLONE ACETONIDE 0.1% (KENALOG) 0.1 % CREAM    Apply to rash bid    ZOLPIDEM (AMBIEN) 10 MG TAB    Take 10 mg by mouth.       Review of  "patient's allergies indicates:   Allergen Reactions    Adhesive     Benzyl alcohol     Iron-b12-if-folic-mv-mins-dss     Mercaptobenzothiazole     Other omega-3s      [employer] [place]  Methyldibromo Gluatronitrile    Propylene glycol     Thimerosal        Past Surgical History:   Procedure Laterality Date    APPENDECTOMY      BLADDER SURGERY      CATARACT EXTRACTION      CHOLECYSTECTOMY      HYSTERECTOMY      INNER EAR SURGERY         History reviewed. No pertinent family history.    Social History     Socioeconomic History    Marital status:    Tobacco Use    Smoking status: Never Smoker    Smokeless tobacco: Never Used       Vitals:    07/27/22 1419   Weight: 74.8 kg (165 lb)   Height: 4' 11" (1.499 m)   PainSc: 0-No pain   PainLoc: Foot       Review of Systems   Constitutional: Negative for chills and fever.   Respiratory: Negative for shortness of breath.    Cardiovascular: Negative for chest pain, palpitations, orthopnea, claudication and leg swelling.   Gastrointestinal: Negative for diarrhea, nausea and vomiting.   Musculoskeletal: Negative for joint pain.   Skin: Negative for rash.   Neurological: Positive for tingling and sensory change.   Psychiatric/Behavioral: Negative.              Objective:      PHYSICAL EXAM: Apperance: Alert and orient in no distress,well developed, and with good attention to grooming and body habits  Patient presents ambulating in casual shoes.   LOWER EXTREMITY EXAM:  VASCULAR: Dorsalis pedis pulses 1/4 bilateral and Posterior Tibial pulses 0/4 bilateral. Capillary fill time <4 seconds bilateral. Decreased edema observed bilateral, right>left. Varicosities present bilateral. Skin temperature of the lower extremities is warm to cool, proximal to distal. Hair growth absent bilateral.  DERMATOLOGICAL: No skin rashes, subcutaneous nodules, lesions, or ulcers observed bilateral. Nails 2,3,4,5 bilateral elongated, thickened, and discolored with subungual debris. " Bilateral hallux nails absent.  Webspaces 1,2,3,4 bilateral clean, dry and without evidence of break in skin integrity.   NEUROLOGICAL: Light touch, sharp-dull, proprioception all present and equal bilaterally.  Vibratory sensation absent at bilateral hallux. Protective sensation absent at 6/10 sites as tested with a [place]-Laure 5.07 monofilament.   MUSCULOSKELETAL: Muscle strength is 5/5 for foot inverters, everters, plantarflexors, and dorsiflexors. Muscle tone is normal.           Assessment:       ICD-10-CM ICD-9-CM   1. DM type 2 with diabetic peripheral neuropathy  E11.42 250.60     357.2   2. Dermatophytosis of nail  B35.1 110.1   3. Bilateral lower extremity edema  R60.0 782.3       Plan:   DM type 2 with diabetic peripheral neuropathy    Dermatophytosis of nail    Bilateral lower extremity edema      I counseled the patient on her conditions, regarding findings of my examination, my impressions, and usual treatment plan.   Greater than 15 minutes of a 20 minute appointment spent on education about the diabetic foot, neuropathy, and prevention of limb loss.  Shoe inspection. Diabetic Foot Education. Patient reminded of the importance of good nutrition and blood sugar control to help prevent podiatric complications of diabetes. Patient instructed on proper foot hygeine. We discussed wearing proper shoe gear, daily foot inspections, never walking without protective shoe gear, never putting sharp instruments to feet.    With patient's permission, nails 2-5 bilateral were debrided/trimmed in length and thickness aggressively to their soft tissue attachment mechanically and with electric , removing all offending nail and debris. Patient relates relief following the procedure.  Patient counseled on ways to improve swelling in lower extremities including decreasing/eliminating salt intake, elevating lower extremities, compression stocking, or oral medications. Patient states she understands.   Patient   will continue to monitor the areas daily, inspect feet, wear protective shoe gear when ambulatory, moisturizer to maintain skin integrity. Patient reminded of the importance of good nutrition and blood sugar control to help prevent podiatric complications of diabetes.  Patient to return 4 months or sooner if needed.              Tessie Coates DPM  [facility]

## 2022-12-29 ENCOUNTER — OFFICE VISIT (OUTPATIENT)
Dept: PODIATRY | Facility: CLINIC | Age: 87
End: 2022-12-29
Payer: MEDICARE

## 2022-12-29 VITALS — BODY MASS INDEX: 33.26 KG/M2 | WEIGHT: 165 LBS | HEIGHT: 59 IN

## 2022-12-29 DIAGNOSIS — E11.42 DM TYPE 2 WITH DIABETIC PERIPHERAL NEUROPATHY: Primary | ICD-10-CM

## 2022-12-29 DIAGNOSIS — B35.1 DERMATOPHYTOSIS OF NAIL: ICD-10-CM

## 2022-12-29 DIAGNOSIS — R60.0 BILATERAL LOWER EXTREMITY EDEMA: ICD-10-CM

## 2022-12-29 PROCEDURE — 11721 DEBRIDE NAIL 6 OR MORE: CPT | Mod: Q9,S$GLB,, | Performed by: PODIATRIST

## 2022-12-29 PROCEDURE — 99499 NO LOS: ICD-10-PCS | Mod: S$GLB,,, | Performed by: PODIATRIST

## 2022-12-29 PROCEDURE — 99499 UNLISTED E&M SERVICE: CPT | Mod: S$GLB,,, | Performed by: PODIATRIST

## 2022-12-29 PROCEDURE — 1101F PR PT FALLS ASSESS DOC 0-1 FALLS W/OUT INJ PAST YR: ICD-10-PCS | Mod: CPTII,S$GLB,, | Performed by: PODIATRIST

## 2022-12-29 PROCEDURE — 1160F RVW MEDS BY RX/DR IN RCRD: CPT | Mod: CPTII,S$GLB,, | Performed by: PODIATRIST

## 2022-12-29 PROCEDURE — 1160F PR REVIEW ALL MEDS BY PRESCRIBER/CLIN PHARMACIST DOCUMENTED: ICD-10-PCS | Mod: CPTII,S$GLB,, | Performed by: PODIATRIST

## 2022-12-29 PROCEDURE — 1159F MED LIST DOCD IN RCRD: CPT | Mod: CPTII,S$GLB,, | Performed by: PODIATRIST

## 2022-12-29 PROCEDURE — 11721 PR DEBRIDEMENT OF NAILS, 6 OR MORE: ICD-10-PCS | Mod: Q9,S$GLB,, | Performed by: PODIATRIST

## 2022-12-29 PROCEDURE — 99999 PR PBB SHADOW E&M-EST. PATIENT-LVL III: ICD-10-PCS | Mod: PBBFAC,,, | Performed by: PODIATRIST

## 2022-12-29 PROCEDURE — 99999 PR PBB SHADOW E&M-EST. PATIENT-LVL III: CPT | Mod: PBBFAC,,, | Performed by: PODIATRIST

## 2022-12-29 PROCEDURE — 1126F AMNT PAIN NOTED NONE PRSNT: CPT | Mod: CPTII,S$GLB,, | Performed by: PODIATRIST

## 2022-12-29 PROCEDURE — 1101F PT FALLS ASSESS-DOCD LE1/YR: CPT | Mod: CPTII,S$GLB,, | Performed by: PODIATRIST

## 2022-12-29 PROCEDURE — 1126F PR PAIN SEVERITY QUANTIFIED, NO PAIN PRESENT: ICD-10-PCS | Mod: CPTII,S$GLB,, | Performed by: PODIATRIST

## 2022-12-29 PROCEDURE — 3288F FALL RISK ASSESSMENT DOCD: CPT | Mod: CPTII,S$GLB,, | Performed by: PODIATRIST

## 2022-12-29 PROCEDURE — 1159F PR MEDICATION LIST DOCUMENTED IN MEDICAL RECORD: ICD-10-PCS | Mod: CPTII,S$GLB,, | Performed by: PODIATRIST

## 2022-12-29 PROCEDURE — 3288F PR FALLS RISK ASSESSMENT DOCUMENTED: ICD-10-PCS | Mod: CPTII,S$GLB,, | Performed by: PODIATRIST

## 2023-01-17 NOTE — PROGRESS NOTES
Subjective:     Patient ID: Carie Rasheed is a 89 y.o. female.    Chief Complaint: Nail Care (No c/o pain, wears slipper with socks, diabetic Pt, PCP Dr. Gonsalez)    Carie is a 89 y.o. female who presents to the clinic for evaluation and treatment of high risk feet. Carie has a past medical history of Acute allergic conjunctivitis, Allergic contact dermatitis, Cancer of skin of left lower leg (2022), Hyperthyroidism, and Vertigo. The patient's chief complaint is long, thick toenails. This patient has documented high risk feet requiring routine maintenance secondary to diabetes mellitis and those secondary complications of diabetes, as mentioned..    PCP: Marcos Gonsalez MD    Date Last Seen by PCP: 10/17/2022    Current shoe gear:  Affected Foot: Slip-on shoes     Unaffected Foot: Slip-on shoes    No results found for: HGBA1C    Patient Active Problem List   Diagnosis    Type II diabetes mellitus with neurological manifestations    Lower leg edema    Hypothyroidism       Medication List with Changes/Refills   Current Medications    ASPIRIN (ECOTRIN) 81 MG EC TABLET    Take 81 mg by mouth.    BUMETANIDE (BUMEX) 1 MG TABLET    Take 1 mg by mouth daily as needed.    GABAPENTIN (NEURONTIN) 600 MG TABLET    Take 600 mg by mouth.    IBUPROFEN (ADVIL,MOTRIN) 200 MG TABLET    Take 400 mg by mouth.    LEVOTHYROXINE (SYNTHROID) 100 MCG TABLET    Take 1 tablet by mouth every morning.    MAGNESIUM OXIDE-MG AA CHELATE 300 MG CAP    Take by mouth.    NYSTATIN (MYCOSTATIN) CREAM    Apply topically.    POTASSIUM CHLORIDE (KLOR-CON) 10 MEQ TBSR    Take 10 mEq by mouth.    TORSEMIDE (DEMADEX) 10 MG TAB    Take 10 mg by mouth daily as needed.    TRAVOPROST (TRAVATAN Z) 0.004 % OPHTHALMIC SOLUTION    Apply 1 drop to eye.    TRIAMCINOLONE ACETONIDE 0.1% (KENALOG) 0.1 % CREAM    Apply to rash bid    ZOLPIDEM (AMBIEN) 10 MG TAB    Take 10 mg by mouth.       Review of patient's allergies indicates:   Allergen Reactions     "Adhesive     Benzyl alcohol     Iron-b12-if-folic-mv-mins-dss     Mercaptobenzothiazole     Other omega-3s      [place] [employer] [place]  Methyldibromo Gluatronitrile    Propylene glycol     Thimerosal        Past Surgical History:   Procedure Laterality Date    APPENDECTOMY      BLADDER SURGERY      CATARACT EXTRACTION      CHOLECYSTECTOMY      HYSTERECTOMY      INNER EAR SURGERY         History reviewed. No pertinent family history.    Social History     Socioeconomic History    Marital status:    Tobacco Use    Smoking status: Never    Smokeless tobacco: Never       Vitals:    12/29/22 1538   Weight: 74.8 kg (165 lb)   Height: 4' 11" (1.499 m)   PainSc: 0-No pain   PainLoc: Foot       Review of Systems   Constitutional:  Negative for chills and fever.   Respiratory:  Negative for shortness of breath.    Cardiovascular:  Negative for chest pain, palpitations, orthopnea, claudication and leg swelling.   Gastrointestinal:  Negative for diarrhea, nausea and vomiting.   Musculoskeletal:  Negative for joint pain.   Skin:  Negative for rash.   Neurological:  Positive for tingling and sensory change.   Psychiatric/Behavioral: Negative.             Objective:      PHYSICAL EXAM: Apperance: Alert and orient in no distress,well developed, and with good attention to grooming and body habits  Patient presents ambulating in casual shoes.   LOWER EXTREMITY EXAM:  VASCULAR: Dorsalis pedis pulses 1/4 bilateral and Posterior Tibial pulses 0/4 bilateral. Capillary fill time <4 seconds bilateral. Decreased edema observed bilateral, right>left. Varicosities present bilateral. Skin temperature of the lower extremities is warm to cool, proximal to distal. Hair growth absent bilateral.  DERMATOLOGICAL: No skin rashes, subcutaneous nodules, lesions, or ulcers observed bilateral. Nails 2,3,4,5 bilateral elongated, thickened, and discolored with subungual debris. Bilateral hallux nails absent.  Webspaces 1,2,3,4 bilateral clean, dry and " without evidence of break in skin integrity.   NEUROLOGICAL: Light touch, sharp-dull, proprioception all present and equal bilaterally.  Vibratory sensation absent at bilateral hallux. Protective sensation absent at 6/10 sites as tested with a [place]-Laure 5.07 monofilament.   MUSCULOSKELETAL: Muscle strength is 5/5 for foot inverters, everters, plantarflexors, and dorsiflexors. Muscle tone is normal.           Assessment:       ICD-10-CM ICD-9-CM   1. DM type 2 with diabetic peripheral neuropathy  E11.42 250.60     357.2   2. Dermatophytosis of nail  B35.1 110.1   3. Bilateral lower extremity edema  R60.0 782.3         Plan:   DM type 2 with diabetic peripheral neuropathy    Dermatophytosis of nail    Bilateral lower extremity edema        I counseled the patient on her conditions, regarding findings of my examination, my impressions, and usual treatment plan.   Greater than 15 minutes of a 20 minute appointment spent on education about the diabetic foot, neuropathy, and prevention of limb loss.  Shoe inspection. Diabetic Foot Education. Patient reminded of the importance of good nutrition and blood sugar control to help prevent podiatric complications of diabetes. Patient instructed on proper foot hygeine. We discussed wearing proper shoe gear, daily foot inspections, never walking without protective shoe gear, never putting sharp instruments to feet.    With patient's permission, nails 2-5 bilateral were debrided/trimmed in length and thickness aggressively to their soft tissue attachment mechanically and with electric , removing all offending nail and debris. Patient relates relief following the procedure.  Patient counseled on ways to improve swelling in lower extremities including decreasing/eliminating salt intake, elevating lower extremities, compression stocking, or oral medications. Patient states she understands.   Patient  will continue to monitor the areas daily, inspect feet, wear protective  shoe gear when ambulatory, moisturizer to maintain skin integrity. Patient reminded of the importance of good nutrition and blood sugar control to help prevent podiatric complications of diabetes.  Patient to return 4 months or sooner if needed.          Tessie Coates DPM  [facility]

## 2023-06-07 ENCOUNTER — OFFICE VISIT (OUTPATIENT)
Dept: PODIATRY | Facility: CLINIC | Age: 88
End: 2023-06-07
Payer: MEDICARE

## 2023-06-07 VITALS — WEIGHT: 165 LBS | BODY MASS INDEX: 33.26 KG/M2 | HEIGHT: 59 IN

## 2023-06-07 DIAGNOSIS — B35.1 DERMATOPHYTOSIS OF NAIL: ICD-10-CM

## 2023-06-07 DIAGNOSIS — E11.9 ENCOUNTER FOR COMPREHENSIVE DIABETIC FOOT EXAMINATION, TYPE 2 DIABETES MELLITUS: Primary | ICD-10-CM

## 2023-06-07 DIAGNOSIS — E11.42 DM TYPE 2 WITH DIABETIC PERIPHERAL NEUROPATHY: ICD-10-CM

## 2023-06-07 PROCEDURE — 3288F FALL RISK ASSESSMENT DOCD: CPT | Mod: CPTII,S$GLB,, | Performed by: PODIATRIST

## 2023-06-07 PROCEDURE — 11721 DEBRIDE NAIL 6 OR MORE: CPT | Mod: Q9,S$GLB,, | Performed by: PODIATRIST

## 2023-06-07 PROCEDURE — 11721 PR DEBRIDEMENT OF NAILS, 6 OR MORE: ICD-10-PCS | Mod: Q9,S$GLB,, | Performed by: PODIATRIST

## 2023-06-07 PROCEDURE — 99213 PR OFFICE/OUTPT VISIT, EST, LEVL III, 20-29 MIN: ICD-10-PCS | Mod: 25,S$GLB,, | Performed by: PODIATRIST

## 2023-06-07 PROCEDURE — 1159F PR MEDICATION LIST DOCUMENTED IN MEDICAL RECORD: ICD-10-PCS | Mod: CPTII,S$GLB,, | Performed by: PODIATRIST

## 2023-06-07 PROCEDURE — 1159F MED LIST DOCD IN RCRD: CPT | Mod: CPTII,S$GLB,, | Performed by: PODIATRIST

## 2023-06-07 PROCEDURE — 99999 PR PBB SHADOW E&M-EST. PATIENT-LVL III: CPT | Mod: PBBFAC,,, | Performed by: PODIATRIST

## 2023-06-07 PROCEDURE — 99213 OFFICE O/P EST LOW 20 MIN: CPT | Mod: 25,S$GLB,, | Performed by: PODIATRIST

## 2023-06-07 PROCEDURE — 1160F RVW MEDS BY RX/DR IN RCRD: CPT | Mod: CPTII,S$GLB,, | Performed by: PODIATRIST

## 2023-06-07 PROCEDURE — 3288F PR FALLS RISK ASSESSMENT DOCUMENTED: ICD-10-PCS | Mod: CPTII,S$GLB,, | Performed by: PODIATRIST

## 2023-06-07 PROCEDURE — 99999 PR PBB SHADOW E&M-EST. PATIENT-LVL III: ICD-10-PCS | Mod: PBBFAC,,, | Performed by: PODIATRIST

## 2023-06-07 PROCEDURE — 1126F AMNT PAIN NOTED NONE PRSNT: CPT | Mod: CPTII,S$GLB,, | Performed by: PODIATRIST

## 2023-06-07 PROCEDURE — 1101F PT FALLS ASSESS-DOCD LE1/YR: CPT | Mod: CPTII,S$GLB,, | Performed by: PODIATRIST

## 2023-06-07 PROCEDURE — 1101F PR PT FALLS ASSESS DOC 0-1 FALLS W/OUT INJ PAST YR: ICD-10-PCS | Mod: CPTII,S$GLB,, | Performed by: PODIATRIST

## 2023-06-07 PROCEDURE — 1126F PR PAIN SEVERITY QUANTIFIED, NO PAIN PRESENT: ICD-10-PCS | Mod: CPTII,S$GLB,, | Performed by: PODIATRIST

## 2023-06-07 PROCEDURE — 1160F PR REVIEW ALL MEDS BY PRESCRIBER/CLIN PHARMACIST DOCUMENTED: ICD-10-PCS | Mod: CPTII,S$GLB,, | Performed by: PODIATRIST

## 2023-06-11 NOTE — PROGRESS NOTES
Subjective:     Patient ID: Carie Rasheed is a 89 y.o. female.    Chief Complaint: Nail Care (No c/opain, Wears casual shoes, Diabetic Pt, Last seen on 03/123 with PCP Dr. Marcos Gonsalez )    Carie is a 89 y.o. female who presents to the clinic for evaluation and treatment of high risk feet. Carie has a past medical history of Acute allergic conjunctivitis, Allergic contact dermatitis, Cancer of skin of left lower leg (2022), Hyperthyroidism, and Vertigo. The patient's chief complaint is long, thick toenails. This patient has documented high risk feet requiring routine maintenance secondary to diabetes mellitis and those secondary complications of diabetes, as mentioned..    PCP: Marcos Gonsalez MD    Date Last Seen by PCP: 03/01/2023    Current shoe gear:  Affected Foot: Slip-on shoes     Unaffected Foot: Slip-on shoes    Hemoglobin A1C   Date Value Ref Range Status   03/17/2023 6.5 (H) 4.2 - 5.8 % Final   09/14/2022 5.9 (H) 4.2 - 5.8 % Final       Patient Active Problem List   Diagnosis    Type II diabetes mellitus with neurological manifestations    Lower leg edema    Hypothyroidism       Medication List with Changes/Refills   Current Medications    ASPIRIN (ECOTRIN) 81 MG EC TABLET    Take 81 mg by mouth.    BUMETANIDE (BUMEX) 1 MG TABLET    Take 1 mg by mouth daily as needed.    GABAPENTIN (NEURONTIN) 600 MG TABLET    Take 600 mg by mouth.    IBUPROFEN (ADVIL,MOTRIN) 200 MG TABLET    Take 400 mg by mouth.    LEVOTHYROXINE (SYNTHROID) 100 MCG TABLET    Take 1 tablet by mouth every morning.    MAGNESIUM OXIDE-MG AA CHELATE 300 MG CAP    Take by mouth.    NYSTATIN (MYCOSTATIN) CREAM    Apply topically.    POTASSIUM CHLORIDE (KLOR-CON) 10 MEQ TBSR    Take 10 mEq by mouth.    TORSEMIDE (DEMADEX) 10 MG TAB    Take 10 mg by mouth daily as needed.    TRAVOPROST (TRAVATAN Z) 0.004 % OPHTHALMIC SOLUTION    Apply 1 drop to eye.    TRIAMCINOLONE ACETONIDE 0.1% (KENALOG) 0.1 % CREAM    Apply to rash bid     "ZOLPIDEM (AMBIEN) 10 MG TAB    Take 10 mg by mouth.       Review of patient's allergies indicates:   Allergen Reactions    Adhesive     Benzyl alcohol     Iron-b12-if-folic-mv-mins-dss     Mercaptobenzothiazole     Other omega-3s      Balsam of [place]  Methyldibromo Gluatronitrile    Propylene glycol     Thimerosal        Past Surgical History:   Procedure Laterality Date    APPENDECTOMY      BLADDER SURGERY      CATARACT EXTRACTION      CHOLECYSTECTOMY      HYSTERECTOMY      INNER EAR SURGERY         History reviewed. No pertinent family history.    Social History     Socioeconomic History    Marital status:    Tobacco Use    Smoking status: Never    Smokeless tobacco: Never       Vitals:    06/07/23 1543   Weight: 74.8 kg (165 lb)   Height: 4' 11" (1.499 m)   PainSc: 0-No pain   PainLoc: Foot       Review of Systems   Constitutional:  Negative for chills and fever.   Respiratory:  Negative for shortness of breath.    Cardiovascular:  Negative for chest pain, palpitations, orthopnea, claudication and leg swelling.   Gastrointestinal:  Negative for diarrhea, nausea and vomiting.   Musculoskeletal:  Negative for joint pain.   Skin:  Negative for rash.   Neurological:  Positive for tingling and sensory change.   Psychiatric/Behavioral: Negative.             Objective:      PHYSICAL EXAM: Apperance: Alert and orient in no distress,well developed, and with good attention to grooming and body habits  Patient presents ambulating in casual shoes.   LOWER EXTREMITY EXAM:  VASCULAR: Dorsalis pedis pulses 1/4 bilateral and Posterior Tibial pulses 0/4 bilateral. Capillary fill time <4 seconds bilateral. Decreased edema observed bilateral, right>left. Varicosities present bilateral. Skin temperature of the lower extremities is warm to cool, proximal to distal. Hair growth absent bilateral.  DERMATOLOGICAL: No skin rashes, subcutaneous nodules, lesions, or ulcers observed bilateral. Nails 2,3,4,5 bilateral elongated, " thickened, and discolored with subungual debris. Bilateral hallux nails absent.  Webspaces 1,2,3,4 bilateral clean, dry and without evidence of break in skin integrity.   NEUROLOGICAL: Light touch, sharp-dull, proprioception all present and equal bilaterally.  Vibratory sensation absent at bilateral hallux. Protective sensation absent at 6/10 sites as tested with a [place]-Laure 5.07 monofilament.   MUSCULOSKELETAL: Muscle strength is 5/5 for foot inverters, everters, plantarflexors, and dorsiflexors. Muscle tone is normal.           Assessment:       ICD-10-CM ICD-9-CM   1. Encounter for comprehensive diabetic foot examination, type 2 diabetes mellitus  E11.9 250.00   2. DM type 2 with diabetic peripheral neuropathy  E11.42 250.60     357.2   3. Dermatophytosis of nail  B35.1 110.1         Plan:   Encounter for comprehensive diabetic foot examination, type 2 diabetes mellitus    DM type 2 with diabetic peripheral neuropathy    Dermatophytosis of nail    I counseled the patient on her conditions, regarding findings of my examination, my impressions, and usual treatment plan.   This visit spent on counseling and coordination of care.  Appointment spent on education about the diabetic foot, neuropathy, and prevention of limb loss.  Shoe inspection. Diabetic Foot Education. Patient reminded of the importance of good nutrition and blood sugar control to help prevent podiatric complications of diabetes. Patient instructed on proper foot hygeine. We discussed wearing proper shoe gear, daily foot inspections, never walking without protective shoe gear, never putting sharp instruments to feet.    With patient's permission, nails 2-5 bilateral were debrided/trimmed in length and thickness aggressively to their soft tissue attachment mechanically and with electric , removing all offending nail and debris. Patient relates relief following the procedure.  Patient counseled on ways to improve swelling in lower  extremities including decreasing/eliminating salt intake, elevating lower extremities, compression stocking, or oral medications. Patient states she understands.   Patient  will continue to monitor the areas daily, inspect feet, wear protective shoe gear when ambulatory, moisturizer to maintain skin integrity. Patient reminded of the importance of good nutrition and blood sugar control to help prevent podiatric complications of diabetes.  Patient to return 4 months or sooner if needed.          Tessie Coates DPM  [facility]

## 2023-12-20 ENCOUNTER — OFFICE VISIT (OUTPATIENT)
Dept: PODIATRY | Facility: CLINIC | Age: 88
End: 2023-12-20
Payer: MEDICARE

## 2023-12-20 VITALS — WEIGHT: 165 LBS | BODY MASS INDEX: 33.26 KG/M2 | HEIGHT: 59 IN

## 2023-12-20 DIAGNOSIS — E11.42 DM TYPE 2 WITH DIABETIC PERIPHERAL NEUROPATHY: Primary | ICD-10-CM

## 2023-12-20 DIAGNOSIS — B35.1 DERMATOPHYTOSIS OF NAIL: ICD-10-CM

## 2023-12-20 DIAGNOSIS — S90.122A TRAUMATIC ECCHYMOSIS OF TOE OF LEFT FOOT, INITIAL ENCOUNTER: ICD-10-CM

## 2023-12-20 PROCEDURE — 99999 PR PBB SHADOW E&M-EST. PATIENT-LVL III: CPT | Mod: PBBFAC,,, | Performed by: PODIATRIST

## 2023-12-20 PROCEDURE — 99499 UNLISTED E&M SERVICE: CPT | Mod: S$GLB,,, | Performed by: PODIATRIST

## 2023-12-20 PROCEDURE — 99499 NO LOS: ICD-10-PCS | Mod: S$GLB,,, | Performed by: PODIATRIST

## 2023-12-20 PROCEDURE — 11721 DEBRIDE NAIL 6 OR MORE: CPT | Mod: Q9,S$GLB,, | Performed by: PODIATRIST

## 2023-12-20 PROCEDURE — 99999 PR PBB SHADOW E&M-EST. PATIENT-LVL III: ICD-10-PCS | Mod: PBBFAC,,, | Performed by: PODIATRIST

## 2023-12-20 PROCEDURE — 11721 PR DEBRIDEMENT OF NAILS, 6 OR MORE: ICD-10-PCS | Mod: Q9,S$GLB,, | Performed by: PODIATRIST

## 2023-12-20 NOTE — PROGRESS NOTES
Subjective:     Patient ID: Carie Rasheed is a 89 y.o. female.    Chief Complaint: Routine Foot Care (4 month nail care, bruise on the left 3rd toe x 1 week, pain in the left great toe, poss due to the nail, cold feet and numbness, discontinued gabapentin a while back, diabetic, wears tennis, last seen PCP Dr. Gonsalez on 09/14/2023)    Carie is a 89 y.o. female who presents to the clinic for evaluation and treatment of high risk feet. Carie has a past medical history of Acute allergic conjunctivitis, Allergic contact dermatitis, Cancer of skin of left lower leg (2022), Hyperthyroidism, and Vertigo. The patient's chief complaint is long, thick toenails. This patient has documented high risk feet requiring routine maintenance secondary to diabetes mellitis and those secondary complications of diabetes, as mentioned..    PCP: Marcos Gonsalez MD    Date Last Seen by PCP: 09/14/2023    Current shoe gear:  Affected Foot: Slip-on shoes     Unaffected Foot: Slip-on shoes    Hemoglobin A1C   Date Value Ref Range Status   09/12/2023 6.7 (H) 4.2 - 5.8 % Final   03/17/2023 6.5 (H) 4.2 - 5.8 % Final   09/14/2022 5.9 (H) 4.2 - 5.8 % Final       Patient Active Problem List   Diagnosis    Type II diabetes mellitus with neurological manifestations    Lower leg edema    Hypothyroidism       Medication List with Changes/Refills   Current Medications    ASPIRIN (ECOTRIN) 81 MG EC TABLET    Take 81 mg by mouth.    BUMETANIDE (BUMEX) 1 MG TABLET    Take 1 mg by mouth daily as needed.    GABAPENTIN (NEURONTIN) 600 MG TABLET    Take 600 mg by mouth.    IBUPROFEN (ADVIL,MOTRIN) 200 MG TABLET    Take 400 mg by mouth.    LEVOTHYROXINE (SYNTHROID) 100 MCG TABLET    Take 1 tablet by mouth every morning.    MAGNESIUM OXIDE-MG AA CHELATE 300 MG CAP    Take by mouth.    NYSTATIN (MYCOSTATIN) CREAM    Apply topically.    POTASSIUM CHLORIDE (KLOR-CON) 10 MEQ TBSR    Take 10 mEq by mouth.    TORSEMIDE (DEMADEX) 10 MG TAB    Take 10 mg by  "mouth daily as needed.    TRAVOPROST (TRAVATAN Z) 0.004 % OPHTHALMIC SOLUTION    Apply 1 drop to eye.    TRIAMCINOLONE ACETONIDE 0.1% (KENALOG) 0.1 % CREAM    Apply to rash bid    ZOLPIDEM (AMBIEN) 10 MG TAB    Take 10 mg by mouth.       Review of patient's allergies indicates:   Allergen Reactions    Adhesive     Benzyl alcohol     Iron-b12-if-folic-mv-mins-dss     Mercaptobenzothiazole     Other omega-3s      Balsam of [place]  Methyldibromo Gluatronitrile    Propylene glycol     Thimerosal        Past Surgical History:   Procedure Laterality Date    APPENDECTOMY      BLADDER SURGERY      CATARACT EXTRACTION      CHOLECYSTECTOMY      HYSTERECTOMY      INNER EAR SURGERY         History reviewed. No pertinent family history.    Social History     Socioeconomic History    Marital status:    Tobacco Use    Smoking status: Never    Smokeless tobacco: Never       Vitals:    12/20/23 1557   Weight: 74.8 kg (165 lb)   Height: 4' 11" (1.499 m)   PainSc: 0-No pain       Review of Systems   Constitutional:  Negative for chills and fever.   Respiratory:  Negative for shortness of breath.    Cardiovascular:  Negative for chest pain, palpitations, orthopnea, claudication and leg swelling.   Gastrointestinal:  Negative for diarrhea, nausea and vomiting.   Musculoskeletal:  Negative for joint pain.   Skin:  Negative for rash.   Neurological:  Positive for tingling and sensory change.   Psychiatric/Behavioral: Negative.             Objective:      PHYSICAL EXAM: Apperance: Alert and orient in no distress,well developed, and with good attention to grooming and body habits  Patient presents ambulating in casual shoes.   LOWER EXTREMITY EXAM:  VASCULAR: Dorsalis pedis pulses 1/4 bilateral and Posterior Tibial pulses 0/4 bilateral. Capillary fill time <4 seconds bilateral. Decreased edema observed bilateral, right>left. Varicosities present bilateral. Skin temperature of the lower extremities is warm to cool, proximal to distal. " Hair growth absent bilateral.  DERMATOLOGICAL: No skin rashes, subcutaneous nodules, lesions, or ulcers observed bilateral. Nails 1,2,3,4,5 bilateral elongated, thickened, and discolored with subungual debris. Bilateral hallux nails absent.  Webspaces 1,2,3,4 bilateral clean, dry and without evidence of break in skin integrity.   NEUROLOGICAL: Light touch, sharp-dull, proprioception all present and equal bilaterally.  Vibratory sensation absent at bilateral hallux. Protective sensation absent at 6/10 sites as tested with a [place]-Laure 5.07 monofilament.   MUSCULOSKELETAL: Muscle strength is 5/5 for foot inverters, everters, plantarflexors, and dorsiflexors. Muscle tone is normal.           Assessment:       ICD-10-CM ICD-9-CM   1. DM type 2 with diabetic peripheral neuropathy  E11.42 250.60     357.2   2. Dermatophytosis of nail  B35.1 110.1   3. Traumatic ecchymosis of toe of left foot, initial encounter  S90.122A 924.3         Plan:   DM type 2 with diabetic peripheral neuropathy    Dermatophytosis of nail    Traumatic ecchymosis of toe of left foot, initial encounter    I counseled the patient on her conditions, regarding findings of my examination, my impressions, and usual treatment plan.   Appointment spent on education about the diabetic foot, neuropathy, and prevention of limb loss.  Shoe inspection. Diabetic Foot Education. Patient reminded of the importance of good nutrition and blood sugar control to help prevent podiatric complications of diabetes. Patient instructed on proper foot hygeine. We discussed wearing proper shoe gear, daily foot inspections, never walking without protective shoe gear, never putting sharp instruments to feet.    With patient's permission, nails 1-5 bilateral were debrided/trimmed in length and thickness aggressively to their soft tissue attachment mechanically and with electric , removing all offending nail and debris. Patient relates relief following the  procedure.  Patient counseled on ways to improve swelling in lower extremities including decreasing/eliminating salt intake, elevating lower extremities, compression stocking, or oral medications. Patient states she understands.   Recommendations given for over-the-counter medicine such as Two Old Goats.  Patient  will continue to monitor the areas daily, inspect feet, wear protective shoe gear when ambulatory, moisturizer to maintain skin integrity. Patient reminded of the importance of good nutrition and blood sugar control to help prevent podiatric complications of diabetes.  Patient to return 4 months or sooner if needed.          Tessie Coates DPM  [facility]

## 2024-01-24 ENCOUNTER — LAB VISIT (OUTPATIENT)
Dept: LAB | Facility: HOSPITAL | Age: 89
End: 2024-01-24
Payer: MEDICARE

## 2024-01-24 DIAGNOSIS — R35.0 URINARY FREQUENCY: ICD-10-CM

## 2024-01-24 LAB
BILIRUB UR QL STRIP: NEGATIVE
CLARITY UR: ABNORMAL
COLOR UR: YELLOW
GLUCOSE UR QL STRIP: NEGATIVE
HGB UR QL STRIP: NEGATIVE
KETONES UR QL STRIP: NEGATIVE
LEUKOCYTE ESTERASE UR QL STRIP: NEGATIVE
NITRITE UR QL STRIP: NEGATIVE
PH UR STRIP: 6 [PH] (ref 5–8)
PROT UR QL STRIP: NEGATIVE
SP GR UR STRIP: 1.02 (ref 1–1.03)
URN SPEC COLLECT METH UR: ABNORMAL

## 2024-01-24 PROCEDURE — 87086 URINE CULTURE/COLONY COUNT: CPT | Performed by: INTERNAL MEDICINE

## 2024-01-24 PROCEDURE — 81003 URINALYSIS AUTO W/O SCOPE: CPT | Performed by: INTERNAL MEDICINE

## 2024-01-26 LAB
BACTERIA UR CULT: NORMAL
BACTERIA UR CULT: NORMAL

## 2024-07-11 ENCOUNTER — OFFICE VISIT (OUTPATIENT)
Dept: PODIATRY | Facility: CLINIC | Age: 89
End: 2024-07-11
Payer: MEDICARE

## 2024-07-11 VITALS — WEIGHT: 164.88 LBS | BODY MASS INDEX: 33.24 KG/M2 | HEIGHT: 59 IN

## 2024-07-11 DIAGNOSIS — E11.42 DM TYPE 2 WITH DIABETIC PERIPHERAL NEUROPATHY: ICD-10-CM

## 2024-07-11 DIAGNOSIS — E11.9 ENCOUNTER FOR COMPREHENSIVE DIABETIC FOOT EXAMINATION, TYPE 2 DIABETES MELLITUS: Primary | ICD-10-CM

## 2024-07-11 DIAGNOSIS — B35.1 DERMATOPHYTOSIS OF NAIL: ICD-10-CM

## 2024-07-11 PROCEDURE — 99213 OFFICE O/P EST LOW 20 MIN: CPT | Mod: 25,S$GLB,, | Performed by: PODIATRIST

## 2024-07-11 PROCEDURE — 1159F MED LIST DOCD IN RCRD: CPT | Mod: CPTII,S$GLB,, | Performed by: PODIATRIST

## 2024-07-11 PROCEDURE — 1101F PT FALLS ASSESS-DOCD LE1/YR: CPT | Mod: CPTII,S$GLB,, | Performed by: PODIATRIST

## 2024-07-11 PROCEDURE — 99999 PR PBB SHADOW E&M-EST. PATIENT-LVL III: CPT | Mod: PBBFAC,,, | Performed by: PODIATRIST

## 2024-07-11 PROCEDURE — 1126F AMNT PAIN NOTED NONE PRSNT: CPT | Mod: CPTII,S$GLB,, | Performed by: PODIATRIST

## 2024-07-11 PROCEDURE — 1160F RVW MEDS BY RX/DR IN RCRD: CPT | Mod: CPTII,S$GLB,, | Performed by: PODIATRIST

## 2024-07-11 PROCEDURE — 11721 DEBRIDE NAIL 6 OR MORE: CPT | Mod: Q9,S$GLB,, | Performed by: PODIATRIST

## 2024-07-11 PROCEDURE — 3288F FALL RISK ASSESSMENT DOCD: CPT | Mod: CPTII,S$GLB,, | Performed by: PODIATRIST

## 2024-07-24 NOTE — PROGRESS NOTES
Subjective:     Patient ID: Carie Rasheed is a 89 y.o. female.    Chief Complaint: Routine Foot Care (4 month nail care, diabetic, wears casual shoes and socks, last seen PCP Dr. Gonsalez on 03/21/24)    Carie is a 89 y.o. female who presents to the clinic for evaluation and treatment of high risk feet. Carie has a past medical history of Acute allergic conjunctivitis, Allergic contact dermatitis, Cancer of skin of left lower leg (2022), Hyperthyroidism, and Vertigo. The patient's chief complaint is long, thick toenails. This patient has documented high risk feet requiring routine maintenance secondary to diabetes mellitis and those secondary complications of diabetes, as mentioned..    PCP: Marcos Gonsalez MD    Date Last Seen by PCP: 03/21/2024    Current shoe gear:  Affected Foot: Slip-on shoes     Unaffected Foot: Slip-on shoes    Hemoglobin A1C   Date Value Ref Range Status   03/13/2024 6.9 (H) 4.2 - 5.8 % Final   09/12/2023 6.7 (H) 4.2 - 5.8 % Final   03/17/2023 6.5 (H) 4.2 - 5.8 % Final       Patient Active Problem List   Diagnosis    Type II diabetes mellitus with neurological manifestations    Lower leg edema    Hypothyroidism       Medication List with Changes/Refills   Current Medications    ASPIRIN (ECOTRIN) 81 MG EC TABLET    Take 81 mg by mouth.    BUMETANIDE (BUMEX) 1 MG TABLET    Take 1 mg by mouth daily as needed.    GABAPENTIN (NEURONTIN) 600 MG TABLET    Take 600 mg by mouth.    IBUPROFEN (ADVIL,MOTRIN) 200 MG TABLET    Take 400 mg by mouth.    LEVOTHYROXINE (SYNTHROID) 100 MCG TABLET    Take 1 tablet by mouth every morning.    MAGNESIUM OXIDE-MG AA CHELATE 300 MG CAP    Take by mouth.    NYSTATIN (MYCOSTATIN) CREAM    Apply topically.    POTASSIUM CHLORIDE (KLOR-CON) 10 MEQ TBSR    Take 10 mEq by mouth.    TORSEMIDE (DEMADEX) 10 MG TAB    Take 10 mg by mouth daily as needed.    TRAVOPROST (TRAVATAN Z) 0.004 % OPHTHALMIC SOLUTION    Apply 1 drop to eye.    TRIAMCINOLONE ACETONIDE 0.1%  "(KENALOG) 0.1 % CREAM    Apply to rash bid    ZOLPIDEM (AMBIEN) 10 MG TAB    Take 10 mg by mouth.       Review of patient's allergies indicates:   Allergen Reactions    Adhesive     Benzyl alcohol     Iron-b12-if-folic-mv-mins-dss     Mercaptobenzothiazole     Other omega-3s      Balsam of [place]  Methyldibromo Gluatronitrile    Propylene glycol     Thimerosal        Past Surgical History:   Procedure Laterality Date    APPENDECTOMY      BLADDER SURGERY      CATARACT EXTRACTION      CHOLECYSTECTOMY      HYSTERECTOMY      INNER EAR SURGERY         No family history on file.    Social History     Socioeconomic History    Marital status:    Tobacco Use    Smoking status: Never    Smokeless tobacco: Never       Vitals:    07/11/24 1540   Weight: 74.8 kg (164 lb 14.5 oz)   Height: 4' 11" (1.499 m)   PainSc: 0-No pain       Review of Systems   Constitutional:  Negative for chills and fever.   Respiratory:  Negative for shortness of breath.    Cardiovascular:  Negative for chest pain, palpitations, orthopnea, claudication and leg swelling.   Gastrointestinal:  Negative for diarrhea, nausea and vomiting.   Musculoskeletal:  Negative for joint pain.   Skin:  Negative for rash.   Neurological:  Positive for tingling and sensory change.   Psychiatric/Behavioral: Negative.             Objective:      PHYSICAL EXAM: Apperance: Alert and orient in no distress,well developed, and with good attention to grooming and body habits  Patient presents ambulating in casual shoes.   LOWER EXTREMITY EXAM:  VASCULAR: Dorsalis pedis pulses 1/4 bilateral and Posterior Tibial pulses 0/4 bilateral. Capillary fill time <4 seconds bilateral. Decreased edema observed bilateral, right>left. Varicosities present bilateral. Skin temperature of the lower extremities is warm to cool, proximal to distal. Hair growth absent bilateral.  DERMATOLOGICAL: No skin rashes, subcutaneous nodules, lesions, or ulcers observed bilateral. Nails 1,2,3,4,5 " bilateral elongated, thickened, and discolored with subungual debris. Bilateral hallux nails absent.  Webspaces 1,2,3,4 bilateral clean, dry and without evidence of break in skin integrity.   NEUROLOGICAL: Light touch, sharp-dull, proprioception all present and equal bilaterally.  Vibratory sensation absent at bilateral hallux. Protective sensation absent at 6/10 sites as tested with a [place]-Laure 5.07 monofilament.   MUSCULOSKELETAL: Muscle strength is 5/5 for foot inverters, everters, plantarflexors, and dorsiflexors. Muscle tone is normal.           Assessment:       ICD-10-CM ICD-9-CM   1. Encounter for comprehensive diabetic foot examination, type 2 diabetes mellitus  E11.9 250.00   2. DM type 2 with diabetic peripheral neuropathy  E11.42 250.60     357.2   3. Dermatophytosis of nail  B35.1 110.1         Plan:   Encounter for comprehensive diabetic foot examination, type 2 diabetes mellitus    DM type 2 with diabetic peripheral neuropathy    Dermatophytosis of nail    I counseled the patient on her conditions, regarding findings of my examination, my impressions, and usual treatment plan.   This visit spent on counseling and coordination of care.  Appointment spent on education about the diabetic foot, neuropathy, and prevention of limb loss.  Shoe inspection. Diabetic Foot Education. Patient reminded of the importance of good nutrition and blood sugar control to help prevent podiatric complications of diabetes. Patient instructed on proper foot hygeine. We discussed wearing proper shoe gear, daily foot inspections, never walking without protective shoe gear, never putting sharp instruments to feet.    With patient's permission, nails 1-5 bilateral were debrided/trimmed in length and thickness aggressively to their soft tissue attachment mechanically and with electric , removing all offending nail and debris. Patient relates relief following the procedure.  Patient counseled on ways to improve  swelling in lower extremities including decreasing/eliminating salt intake, elevating lower extremities, compression stocking, or oral medications. Patient states she understands.   Recommendations given for over-the-counter medicine such as Two Old Goats.  Patient  will continue to monitor the areas daily, inspect feet, wear protective shoe gear when ambulatory, moisturizer to maintain skin integrity. Patient reminded of the importance of good nutrition and blood sugar control to help prevent podiatric complications of diabetes.  Patient to return 4 months or sooner if needed.          Tessie Coates DPM  [facility]